# Patient Record
Sex: FEMALE | Race: WHITE | NOT HISPANIC OR LATINO | URBAN - METROPOLITAN AREA
[De-identification: names, ages, dates, MRNs, and addresses within clinical notes are randomized per-mention and may not be internally consistent; named-entity substitution may affect disease eponyms.]

---

## 2017-01-16 ENCOUNTER — EMERGENCY (EMERGENCY)
Facility: HOSPITAL | Age: 82
LOS: 1 days | Discharge: HOME | End: 2017-01-16
Attending: EMERGENCY MEDICINE
Payer: MEDICARE

## 2017-01-16 VITALS
HEIGHT: 58 IN | HEART RATE: 91 BPM | OXYGEN SATURATION: 97 % | RESPIRATION RATE: 17 BRPM | DIASTOLIC BLOOD PRESSURE: 77 MMHG | SYSTOLIC BLOOD PRESSURE: 144 MMHG | WEIGHT: 95.02 LBS | TEMPERATURE: 97 F

## 2017-01-16 VITALS
RESPIRATION RATE: 16 BRPM | SYSTOLIC BLOOD PRESSURE: 121 MMHG | OXYGEN SATURATION: 100 % | TEMPERATURE: 97 F | HEART RATE: 78 BPM | DIASTOLIC BLOOD PRESSURE: 70 MMHG
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
## 2017-01-16 DIAGNOSIS — Z79.82 LONG TERM (CURRENT) USE OF ASPIRIN: ICD-10-CM

## 2017-01-16 DIAGNOSIS — Z88.0 ALLERGY STATUS TO PENICILLIN: ICD-10-CM

## 2017-01-16 DIAGNOSIS — S00.03XA CONTUSION OF SCALP, INITIAL ENCOUNTER: ICD-10-CM

## 2017-01-16 DIAGNOSIS — S09.90XA UNSPECIFIED INJURY OF HEAD, INITIAL ENCOUNTER: ICD-10-CM
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
## 2017-01-16 PROCEDURE — 99284 EMERGENCY DEPT VISIT MOD MDM: CPT

## 2017-01-16 PROCEDURE — 70450 CT HEAD/BRAIN W/O DYE: CPT | Mod: 26

## 2017-01-16 NOTE — ED ADULT TRIAGE NOTE - CHIEF COMPLAINT QUOTE
I hit my head against my dresser last night. I have a bump in my head. Pt states she slipped on moth balls, denies any loc. Pt states she takes a baby asa everyday.

## 2017-01-16 NOTE — ED ADULT NURSE NOTE - OBJECTIVE STATEMENT
" I slipped on a mothball and hit my head on the dresser. I did not pass out.I put iceit and took tylenol this morning"

## 2017-01-16 NOTE — ED ADULT NURSE NOTE - CHPI ED SYMPTOMS NEG
no loss of consciousness/no weakness/no syncope/no blurred vision/no vomiting/no seizure/no nausea/no dizziness/no confusion/no change in level of consciousness

## 2017-01-16 NOTE — ED PROVIDER NOTE - OBJECTIVE STATEMENT
Pertinent PMH/PSH/FHx/SHx and Review of Systems contained within:  89f hx of htn pw headache. last night slipped and fell, hit right side of scalp. no loc. no vision change, vomiting, difficulty walking. iced head overnight. this morning came to get head checked out  Fh and Sh not otherwise contributory  ROS otherwise negative

## 2017-01-16 NOTE — ED PROVIDER NOTE - PHYSICAL EXAMINATION
Gen: elderly female.   Head: right scalp hematoma. no skull defect  Eyes: PERRL, EOMI, normal lids/conjunctiva  ENT: normal hearing, patent oropharynx without erythema/exudate, uvula midline  Neck: supple, no tenderness, Trachea midline  Pulm: Bilateral BS, normal resp effort, no wheeze/stridor/retractions  CV: RRR, no M/R/G, 2+ radial and dp pulses bl, no edema  Abd: soft, NT/ND, +BS, no hepatosplenomegaly  Mskel: extremities x4 with normal ROM and no joint effusions. no ctl spine ttp.   Skin: no rash, no bruising, small hematoma right scalp  Neuro: AAOx3, no sensory/motor deficits, CN 2-12 intact

## 2017-01-25 ENCOUNTER — MEDICATION RENEWAL (OUTPATIENT)
Age: 82
End: 2017-01-25

## 2017-02-03 ENCOUNTER — APPOINTMENT (OUTPATIENT)
Dept: NEPHROLOGY | Facility: CLINIC | Age: 82
End: 2017-02-03

## 2017-02-03 VITALS
HEIGHT: 59 IN | WEIGHT: 97 LBS | BODY MASS INDEX: 19.56 KG/M2 | HEART RATE: 76 BPM | DIASTOLIC BLOOD PRESSURE: 77 MMHG | SYSTOLIC BLOOD PRESSURE: 143 MMHG | OXYGEN SATURATION: 97 %

## 2017-02-03 VITALS — DIASTOLIC BLOOD PRESSURE: 64 MMHG | HEART RATE: 70 BPM | SYSTOLIC BLOOD PRESSURE: 144 MMHG

## 2017-04-03 ENCOUNTER — RX RENEWAL (OUTPATIENT)
Age: 82
End: 2017-04-03

## 2017-04-29 ENCOUNTER — EMERGENCY (EMERGENCY)
Facility: HOSPITAL | Age: 82
LOS: 0 days | Discharge: HOME | End: 2017-04-29
Attending: STUDENT IN AN ORGANIZED HEALTH CARE EDUCATION/TRAINING PROGRAM
Payer: MEDICARE

## 2017-04-29 VITALS
DIASTOLIC BLOOD PRESSURE: 76 MMHG | HEIGHT: 58 IN | TEMPERATURE: 98 F | RESPIRATION RATE: 20 BRPM | WEIGHT: 98.11 LBS | OXYGEN SATURATION: 96 % | HEART RATE: 88 BPM | SYSTOLIC BLOOD PRESSURE: 167 MMHG

## 2017-04-29 VITALS
SYSTOLIC BLOOD PRESSURE: 181 MMHG | RESPIRATION RATE: 17 BRPM | OXYGEN SATURATION: 97 % | TEMPERATURE: 97 F | DIASTOLIC BLOOD PRESSURE: 84 MMHG | HEART RATE: 72 BPM

## 2017-04-29 DIAGNOSIS — R42 DIZZINESS AND GIDDINESS: ICD-10-CM

## 2017-04-29 DIAGNOSIS — Z88.2 ALLERGY STATUS TO SULFONAMIDES: ICD-10-CM

## 2017-04-29 DIAGNOSIS — Z88.0 ALLERGY STATUS TO PENICILLIN: ICD-10-CM

## 2017-04-29 DIAGNOSIS — I10 ESSENTIAL (PRIMARY) HYPERTENSION: ICD-10-CM

## 2017-04-29 LAB
ALBUMIN SERPL ELPH-MCNC: 3.5 G/DL — SIGNIFICANT CHANGE UP (ref 3.3–5)
ALP SERPL-CCNC: 69 U/L — SIGNIFICANT CHANGE UP (ref 40–120)
ALT FLD-CCNC: 25 U/L — SIGNIFICANT CHANGE UP (ref 12–78)
ANION GAP SERPL CALC-SCNC: 9 MMOL/L — SIGNIFICANT CHANGE UP (ref 5–17)
APPEARANCE UR: CLEAR — SIGNIFICANT CHANGE UP
APTT BLD: 33.6 SEC — SIGNIFICANT CHANGE UP (ref 27.5–37.4)
AST SERPL-CCNC: 25 U/L — SIGNIFICANT CHANGE UP (ref 15–37)
BACTERIA # UR AUTO: ABNORMAL
BASOPHILS # BLD AUTO: 0.2 K/UL — SIGNIFICANT CHANGE UP (ref 0–0.2)
BASOPHILS NFR BLD AUTO: 2.3 % — HIGH (ref 0–2)
BILIRUB SERPL-MCNC: 0.3 MG/DL — SIGNIFICANT CHANGE UP (ref 0.2–1.2)
BILIRUB UR-MCNC: NEGATIVE — SIGNIFICANT CHANGE UP
BUN SERPL-MCNC: 28 MG/DL — HIGH (ref 7–23)
CALCIUM SERPL-MCNC: 9.2 MG/DL — SIGNIFICANT CHANGE UP (ref 8.5–10.1)
CHLORIDE SERPL-SCNC: 104 MMOL/L — SIGNIFICANT CHANGE UP (ref 96–108)
CK MB BLD-MCNC: 2.8 % — SIGNIFICANT CHANGE UP (ref 0–3.5)
CK MB CFR SERPL CALC: 4 NG/ML — HIGH (ref 0.5–3.6)
CK SERPL-CCNC: 143 U/L — SIGNIFICANT CHANGE UP (ref 26–192)
CO2 SERPL-SCNC: 29 MMOL/L — SIGNIFICANT CHANGE UP (ref 22–31)
COLOR SPEC: YELLOW — SIGNIFICANT CHANGE UP
CREAT SERPL-MCNC: 0.83 MG/DL — SIGNIFICANT CHANGE UP (ref 0.5–1.3)
DIFF PNL FLD: ABNORMAL
EOSINOPHIL # BLD AUTO: 0.2 K/UL — SIGNIFICANT CHANGE UP (ref 0–0.5)
EOSINOPHIL NFR BLD AUTO: 2.4 % — SIGNIFICANT CHANGE UP (ref 0–6)
EPI CELLS # UR: SIGNIFICANT CHANGE UP
GLUCOSE SERPL-MCNC: 158 MG/DL — HIGH (ref 70–99)
GLUCOSE UR QL: NEGATIVE MG/DL — SIGNIFICANT CHANGE UP
HCT VFR BLD CALC: 40.9 % — SIGNIFICANT CHANGE UP (ref 34.5–45)
HGB BLD-MCNC: 13.5 G/DL — SIGNIFICANT CHANGE UP (ref 11.5–15.5)
INR BLD: 1.01 RATIO — SIGNIFICANT CHANGE UP (ref 0.88–1.16)
KETONES UR-MCNC: NEGATIVE — SIGNIFICANT CHANGE UP
LEUKOCYTE ESTERASE UR-ACNC: ABNORMAL
LYMPHOCYTES # BLD AUTO: 0.8 K/UL — LOW (ref 1–3.3)
LYMPHOCYTES # BLD AUTO: 11.5 % — LOW (ref 13–44)
MCHC RBC-ENTMCNC: 29.6 PG — SIGNIFICANT CHANGE UP (ref 27–34)
MCHC RBC-ENTMCNC: 33.1 GM/DL — SIGNIFICANT CHANGE UP (ref 32–36)
MCV RBC AUTO: 89.5 FL — SIGNIFICANT CHANGE UP (ref 80–100)
MONOCYTES # BLD AUTO: 0.3 K/UL — SIGNIFICANT CHANGE UP (ref 0–0.9)
MONOCYTES NFR BLD AUTO: 4.2 % — SIGNIFICANT CHANGE UP (ref 2–14)
NEUTROPHILS # BLD AUTO: 5.6 K/UL — SIGNIFICANT CHANGE UP (ref 1.8–7.4)
NEUTROPHILS NFR BLD AUTO: 79.5 % — HIGH (ref 43–77)
NITRITE UR-MCNC: NEGATIVE — SIGNIFICANT CHANGE UP
PH UR: 7 — SIGNIFICANT CHANGE UP (ref 5–8)
PLATELET # BLD AUTO: 256 K/UL — SIGNIFICANT CHANGE UP (ref 150–400)
POTASSIUM SERPL-MCNC: 3.9 MMOL/L — SIGNIFICANT CHANGE UP (ref 3.5–5.3)
POTASSIUM SERPL-SCNC: 3.9 MMOL/L — SIGNIFICANT CHANGE UP (ref 3.5–5.3)
PROT SERPL-MCNC: 7 GM/DL — SIGNIFICANT CHANGE UP (ref 6–8.3)
PROT UR-MCNC: NEGATIVE MG/DL — SIGNIFICANT CHANGE UP
PROTHROM AB SERPL-ACNC: 11 SEC — SIGNIFICANT CHANGE UP (ref 9.8–12.7)
RBC # BLD: 4.57 M/UL — SIGNIFICANT CHANGE UP (ref 3.8–5.2)
RBC # FLD: 12.7 % — SIGNIFICANT CHANGE UP (ref 11–15)
RBC CASTS # UR COMP ASSIST: SIGNIFICANT CHANGE UP /HPF (ref 0–4)
SODIUM SERPL-SCNC: 142 MMOL/L — SIGNIFICANT CHANGE UP (ref 135–145)
SP GR SPEC: 1.01 — SIGNIFICANT CHANGE UP (ref 1.01–1.02)
TROPONIN I SERPL-MCNC: <.015 NG/ML — SIGNIFICANT CHANGE UP (ref 0.01–0.04)
UROBILINOGEN FLD QL: NEGATIVE MG/DL — SIGNIFICANT CHANGE UP
WBC # BLD: 7.1 K/UL — SIGNIFICANT CHANGE UP (ref 3.8–10.5)
WBC # FLD AUTO: 7.1 K/UL — SIGNIFICANT CHANGE UP (ref 3.8–10.5)
WBC UR QL: SIGNIFICANT CHANGE UP

## 2017-04-29 PROCEDURE — 99285 EMERGENCY DEPT VISIT HI MDM: CPT

## 2017-04-29 PROCEDURE — 71010: CPT | Mod: 26

## 2017-04-29 PROCEDURE — 70450 CT HEAD/BRAIN W/O DYE: CPT | Mod: 26

## 2017-04-29 RX ORDER — SODIUM CHLORIDE 9 MG/ML
3 INJECTION INTRAMUSCULAR; INTRAVENOUS; SUBCUTANEOUS ONCE
Qty: 0 | Refills: 0 | Status: COMPLETED | OUTPATIENT
Start: 2017-04-29 | End: 2017-04-29

## 2017-04-29 RX ORDER — MECLIZINE HCL 12.5 MG
12.5 TABLET ORAL ONCE
Qty: 0 | Refills: 0 | Status: COMPLETED | OUTPATIENT
Start: 2017-04-29 | End: 2017-04-29

## 2017-04-29 RX ORDER — MECLIZINE HCL 12.5 MG
1 TABLET ORAL
Qty: 30 | Refills: 0 | OUTPATIENT
Start: 2017-04-29 | End: 2017-05-14

## 2017-04-29 RX ORDER — SODIUM CHLORIDE 9 MG/ML
1000 INJECTION INTRAMUSCULAR; INTRAVENOUS; SUBCUTANEOUS
Qty: 0 | Refills: 0 | Status: DISCONTINUED | OUTPATIENT
Start: 2017-04-29 | End: 2017-04-29

## 2017-04-29 RX ADMIN — SODIUM CHLORIDE 3 MILLILITER(S): 9 INJECTION INTRAMUSCULAR; INTRAVENOUS; SUBCUTANEOUS at 09:50

## 2017-04-29 RX ADMIN — Medication 12.5 MILLIGRAM(S): at 12:13

## 2017-04-29 RX ADMIN — SODIUM CHLORIDE 60 MILLILITER(S): 9 INJECTION INTRAMUSCULAR; INTRAVENOUS; SUBCUTANEOUS at 10:39

## 2017-04-29 NOTE — ED PROVIDER NOTE - OBJECTIVE STATEMENT
89 year old female with h/o HTN and vertigo presents today brought in by a close friend c/o dizziness which started since last night, pt describes a dizziness causing her to be unsteady 89 year old female with h/o HTN and vertigo presents today brought in by a close friend c/o dizziness which started since last night, pt describes a dizziness causing her to be unsteady, pt has h/o vertigo many years ago but states that this felt different, (-) nausea or vomiting (-) chest pains (-) sob (-) palpitations (-) headache (-) visual or speech changes

## 2017-04-29 NOTE — ED PROVIDER NOTE - MEDICAL DECISION MAKING DETAILS
pt presented c/o dizziness since last night with unsteadiness, labs, ekg, xray chest and ct head pt presented c/o dizziness since last night with unsteadiness, labs, ekg, xray chest and ct head, pt seen by dr nieves who cleared her for discharge, outpt followup, antivert prescribed

## 2017-04-29 NOTE — ED PROVIDER NOTE - PROGRESS NOTE DETAILS
pt seen and evaluated by dr nieves in the ER, pt cleared for discharge, vitals stable, able to eat, no vomiting noted, outpt follow up +urine culture, not on abx, mailgram sent

## 2017-04-29 NOTE — CONSULT NOTE ADULT - SUBJECTIVE AND OBJECTIVE BOX
Historian:     Patient.  ER notes also reviewed.  HPI:    RENETTA LARSEN.  · HPI Objective Statement: 89 year old female with h/o HTN and vertigo presents today brought in by a close friend c/o dizziness which started since last night, pt describes a dizziness causing her to be unsteady, pt has h/o vertigo many years ago but states that this felt different, (-) nausea or vomiting (-) chest pains (-) sob (-) palpitations (-) headache (-) visual or speech changes    Brain CT:  Involutional and ischemic gliotic changes.  No acute intracranial hemorrhage.    PAST MEDICAL & SURGICAL HISTORY:  Hypertension; No significant past surgical history  MEDICATIONS  (STANDING):  sodium chloride 0.9%. 1000milliLiter(s) IV Continuous <Continuous>  MEDICATIONS  (PRN):  Allergies: penicillin (Unknown)  sulfa drugs (Unknown)    Intolerances      FAMILY HISTORY:  No pertinent family history in first degree relatives    Vital Signs Last 24 Hrs  T(C): 37.1, Max: 37.1 ( @ 10:56)  T(F): 98.7, Max: 98.7 ( @ 10:56)  HR: 67 (67 - 88)  BP: 157/68 (157/68 - 167/76)  BP(mean): --  RR: 21 (20 - 21)  SpO2: 98% (96% - 98%)    NEUROLOGICAL EXAM:  HENT:  Normocephalic head; atraumatic head.  Neck supple.  ENT: normal looking.  Mental State:   Excellent for her age.   Alert.  Fully oriented to person, place and date.  Coherent.  Speech clear and intact.  Cooperative.  Responds appropriately.    Cranial Nerves:  II-XII:   Pupils round and reactive to light and accommodation.  Extraocular movements full.  Visual fields full (no homonymous hemianopsia).  Visual acuity wnl.  Facial symmetry intact.  Tongue midline.  Motor Functions:  Moves all extremities.  No pronator drift of UE.  Claps hands well.  Hand  intact bilaterally.   Sensory Functions:   Intact to touch and pinprick to face and extremities.    Reflexes:  Deep tendon reflexes normoactive to knees and ankles.  Babinski absent   Cerebellar Testing:    Finger to nose intact.  Nystagmus absent.   Neurovascular: Carotid auscultation full without bruits.      LABS:                        13.5   7.1   )-----------( 256      ( 2017 09:54 )             40.9         142  |  104  |  28<H>  ----------------------------<  158<H>  3.9   |  29  |  0.83    Ca    9.2      2017 09:54    TPro  7.0  /  Alb  3.5  /  TBili  0.3  /  DBili  x   /  AST  25  /  ALT  25  /  AlkPhos  69      PT/INR - ( 2017 09:54 )   PT: 11.0 sec;   INR: 1.01 ratio         PTT - ( 2017 09:54 )  PTT:33.6 sec    Urinalysis Basic - ( 2017 11:25 )    Color: Yellow / Appearance: Clear / S.010 / pH: x  Gluc: x / Ketone: Negative  / Bili: Negative / Urobili: Negative mg/dL   Blood: x / Protein: Negative mg/dL / Nitrite: Negative   Leuk Esterase: Trace / RBC: 0-2 /HPF / WBC 3-5   Sq Epi: x / Non Sq Epi: Occasional / Bacteria: Moderate    RADIOLOGY & ADDITIONAL STUDIES:    Comprehensive Metabolic Panel: STAT ( @ 09:12)  Complete Blood Count + Automated Diff: STAT ( @ 09:12)  Creatine Kinase, Serum: STAT ( @ 09:12)  CKMB Mass Assay: STAT ( @ 09:12)  Troponin I, Serum: STAT ( @ 09:12)  Activated Partial Thromboplastin Time:  Start Date:2017. STAT ( @ 09:12)  Prothrombin Time and INR, Plasma:  Start Date:2017. STAT ( @ 09:12)  Saline Lock Insert.:     Time/Priority:  STAT ( @ 09:12)  sodium chloride 0.9% lock flush:   3 milliLiter(s), IV Push, once, Stop After 1 Doses ( @ 09:12) [completed]  Vital Signs:     Frequency:  per routine ( @ 09:12)  Pulse Oximetry:   Frequency: &lt;Continuous&gt;    Additional Instructions:  Notify Provider if oxygen saturation is LESS THAN 92% ( @ 09:12)  Cardiac Monitor Bedside:     Time/Priority:  STAT ( @ 09:12)  Assess Pain:     Frequency:  per routine ( @ 09:12)  Activity - Bedrest ( @ 09:12)  sodium chloride 0.9%.: Solution, 1000 milliLiter(s) infuse at 60 mL/Hr  Provider&#x27;s Contact #: [phone] ( @ 10:18)  CT Head No Cont: Urgent   Indication: dizziness and unsteadiness  Transport: Stretcher-Crib  Exam Completed  Provider&#x27;s Contact #: [phone] ( @ 10:18)  Xray Chest 1 View AP/PA.: Urgent   Indication: r/o infiltrate  Transport: Portable  Exam Completed  Provider&#x27;s Contact #: [phone] ( @ 10:18)  Urinalysis: STAT ( @ 10:18)  Culture - Urine: Routine  Specimen Source: Clean Catch (Midstream) ( @ 10:18)  Urine Microscopic-Add On (NC): 11:20 ( @ 11:34)  meclizine: [Ordered as ANTIVERT]  12.5 milliGRAM(s), Oral, Once, Stop After 1 Doses  Provider&#x27;s Contact #: [phone] ( @ 12:04) [completed]  12 Lead ECG ( @ 13:24)    Assessment & Opinion:  Dizziness/Vertigo.  Normal Neurologic Exam for this elderly.    Recommendations:  Continue Antivert 25 mg. BID.  Neurologically cleared for dc.  Office follow up.

## 2017-04-29 NOTE — ED ADULT TRIAGE NOTE - CHIEF COMPLAINT QUOTE
patient c/o of dizziness started last night , patient denied chest pain no difficulty breathing , denied headache no N/V at this time

## 2017-05-03 LAB
CULTURE RESULTS: SIGNIFICANT CHANGE UP
ORGANISM # SPEC MICROSCOPIC CNT: SIGNIFICANT CHANGE UP
ORGANISM # SPEC MICROSCOPIC CNT: SIGNIFICANT CHANGE UP
SPECIMEN SOURCE: SIGNIFICANT CHANGE UP

## 2017-06-02 ENCOUNTER — MEDICATION RENEWAL (OUTPATIENT)
Age: 82
End: 2017-06-02

## 2017-06-27 ENCOUNTER — APPOINTMENT (OUTPATIENT)
Dept: NEPHROLOGY | Facility: CLINIC | Age: 82
End: 2017-06-27

## 2017-06-27 VITALS
BODY MASS INDEX: 20.22 KG/M2 | DIASTOLIC BLOOD PRESSURE: 85 MMHG | HEIGHT: 59 IN | HEART RATE: 80 BPM | SYSTOLIC BLOOD PRESSURE: 190 MMHG | OXYGEN SATURATION: 97 % | WEIGHT: 100.31 LBS

## 2017-06-27 VITALS — SYSTOLIC BLOOD PRESSURE: 180 MMHG | HEART RATE: 78 BPM | DIASTOLIC BLOOD PRESSURE: 80 MMHG

## 2017-06-27 LAB
APPEARANCE: CLEAR
BACTERIA: ABNORMAL
BILIRUBIN URINE: NEGATIVE
BLOOD URINE: NEGATIVE
COLOR: YELLOW
CREAT SPEC-SCNC: 30 MG/DL
GLUCOSE QUALITATIVE U: NORMAL MG/DL
HYALINE CASTS: 0 /LPF
KETONES URINE: NEGATIVE
LEUKOCYTE ESTERASE URINE: NEGATIVE
MICROALBUMIN 24H UR DL<=1MG/L-MCNC: 1.5 MG/DL
MICROALBUMIN/CREAT 24H UR-RTO: 50 MG/G
MICROSCOPIC-UA: NORMAL
NITRITE URINE: NEGATIVE
PH URINE: 7
PROTEIN URINE: NEGATIVE MG/DL
RED BLOOD CELLS URINE: 1 /HPF
SPECIFIC GRAVITY URINE: 1.01
SQUAMOUS EPITHELIAL CELLS: 2 /HPF
UROBILINOGEN URINE: NORMAL MG/DL
WHITE BLOOD CELLS URINE: 2 /HPF

## 2017-06-29 ENCOUNTER — OTHER (OUTPATIENT)
Age: 82
End: 2017-06-29

## 2017-06-29 LAB — BACTERIA UR CULT: ABNORMAL

## 2017-10-23 ENCOUNTER — APPOINTMENT (OUTPATIENT)
Dept: NEPHROLOGY | Facility: CLINIC | Age: 82
End: 2017-10-23
Payer: MEDICARE

## 2017-10-23 VITALS
HEIGHT: 59 IN | BODY MASS INDEX: 19.56 KG/M2 | DIASTOLIC BLOOD PRESSURE: 82 MMHG | WEIGHT: 97 LBS | HEART RATE: 82 BPM | OXYGEN SATURATION: 98 % | SYSTOLIC BLOOD PRESSURE: 173 MMHG

## 2017-10-23 VITALS — DIASTOLIC BLOOD PRESSURE: 60 MMHG | SYSTOLIC BLOOD PRESSURE: 146 MMHG | HEART RATE: 78 BPM

## 2017-10-23 PROCEDURE — 99213 OFFICE O/P EST LOW 20 MIN: CPT

## 2018-03-05 ENCOUNTER — MEDICATION RENEWAL (OUTPATIENT)
Age: 83
End: 2018-03-05

## 2018-03-05 ENCOUNTER — RX RENEWAL (OUTPATIENT)
Age: 83
End: 2018-03-05

## 2018-03-30 ENCOUNTER — EMERGENCY (EMERGENCY)
Facility: HOSPITAL | Age: 83
LOS: 0 days | Discharge: HOME | End: 2018-03-30
Attending: EMERGENCY MEDICINE
Payer: MEDICARE

## 2018-03-30 VITALS
SYSTOLIC BLOOD PRESSURE: 148 MMHG | RESPIRATION RATE: 15 BRPM | HEART RATE: 75 BPM | DIASTOLIC BLOOD PRESSURE: 74 MMHG | OXYGEN SATURATION: 96 % | TEMPERATURE: 98 F

## 2018-03-30 VITALS
TEMPERATURE: 98 F | SYSTOLIC BLOOD PRESSURE: 197 MMHG | OXYGEN SATURATION: 99 % | RESPIRATION RATE: 14 BRPM | WEIGHT: 93.92 LBS | DIASTOLIC BLOOD PRESSURE: 113 MMHG | HEIGHT: 58 IN | HEART RATE: 88 BPM
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
## 2018-03-30 DIAGNOSIS — S09.90XA UNSPECIFIED INJURY OF HEAD, INITIAL ENCOUNTER: ICD-10-CM

## 2018-03-30 DIAGNOSIS — Z79.82 LONG TERM (CURRENT) USE OF ASPIRIN: ICD-10-CM

## 2018-03-30 DIAGNOSIS — Z88.0 ALLERGY STATUS TO PENICILLIN: ICD-10-CM

## 2018-03-30 DIAGNOSIS — S00.03XA CONTUSION OF SCALP, INITIAL ENCOUNTER: ICD-10-CM

## 2018-03-30 PROCEDURE — 73562 X-RAY EXAM OF KNEE 3: CPT | Mod: 26,LT

## 2018-03-30 PROCEDURE — 72125 CT NECK SPINE W/O DYE: CPT | Mod: 26

## 2018-03-30 PROCEDURE — 70450 CT HEAD/BRAIN W/O DYE: CPT | Mod: 26

## 2018-03-30 PROCEDURE — 99284 EMERGENCY DEPT VISIT MOD MDM: CPT

## 2018-03-30 PROCEDURE — 71101 X-RAY EXAM UNILAT RIBS/CHEST: CPT | Mod: 26,LT

## 2018-03-30 RX ORDER — ACETAMINOPHEN 500 MG
975 TABLET ORAL ONCE
Qty: 0 | Refills: 0 | Status: COMPLETED | OUTPATIENT
Start: 2018-03-30 | End: 2018-03-30

## 2018-03-30 RX ADMIN — Medication 975 MILLIGRAM(S): at 12:12

## 2018-03-30 NOTE — ED ADULT NURSE NOTE - CAS EDN DISCHARGE ASSESSMENT
Awake/No adverse reaction to first time med in ED/Alert and oriented to person, place and time/Symptoms improved/Dressing clean and dry

## 2018-03-30 NOTE — ED PROVIDER NOTE - PHYSICAL EXAMINATION
Gen: Alert, NAD  Head: very small scalp hematoma  Eyes: PERRL, EOMI, normal lids/conjunctiva  ENT: normal hearing, patent oropharynx without erythema/exudate, uvula midline  Neck: supple, no tenderness, Trachea midline  Pulm: Bilateral BS, normal resp effort, no wheeze/stridor/retractions  CV: RRR, no M/R/G, 2+ radial and dp pulses bl, no edema  Abd: soft, NT/ND, +BS, no hepatosplenomegaly  Mskel: left knee small effusion, normal rom. left elbow normal rom. left shoulder normal rom. no ctl spine ttp.   Skin: ecchymosis of the left knee and left elbow.   Neuro: AAOx3, no sensory/motor deficits, CN 2-12 intact

## 2018-03-30 NOTE — ED ADULT NURSE NOTE - OBJECTIVE STATEMENT
Pt states she slid off bed while attempting to answer her phone. Pt states she fell on her left side denies any loc with fall. Pt c/o left side rib pain worsens with deep inspiration, denies any chest pain

## 2018-03-30 NOTE — ED ADULT TRIAGE NOTE - CHIEF COMPLAINT QUOTE
Fell  off bed , attempting picking up ringing phone .stated she hit her head ,On Baby aspirin daily .  denies loc ,  C/o entire left side pain, pain left rib , worse with deep breathing , bruise to left elbow

## 2018-04-05 ENCOUNTER — RX RENEWAL (OUTPATIENT)
Age: 83
End: 2018-04-05

## 2018-04-30 ENCOUNTER — APPOINTMENT (OUTPATIENT)
Dept: NEPHROLOGY | Facility: CLINIC | Age: 83
End: 2018-04-30
Payer: MEDICARE

## 2018-04-30 VITALS
OXYGEN SATURATION: 98 % | BODY MASS INDEX: 19.11 KG/M2 | WEIGHT: 94.8 LBS | HEIGHT: 59 IN | HEART RATE: 71 BPM | DIASTOLIC BLOOD PRESSURE: 55 MMHG | SYSTOLIC BLOOD PRESSURE: 112 MMHG | TEMPERATURE: 97.8 F

## 2018-04-30 PROCEDURE — 99213 OFFICE O/P EST LOW 20 MIN: CPT

## 2018-07-24 ENCOUNTER — MEDICATION RENEWAL (OUTPATIENT)
Age: 83
End: 2018-07-24

## 2018-07-24 ENCOUNTER — RX RENEWAL (OUTPATIENT)
Age: 83
End: 2018-07-24

## 2018-10-04 ENCOUNTER — APPOINTMENT (OUTPATIENT)
Dept: NEPHROLOGY | Facility: CLINIC | Age: 83
End: 2018-10-04
Payer: MEDICARE

## 2018-10-04 VITALS
WEIGHT: 94.8 LBS | HEART RATE: 70 BPM | BODY MASS INDEX: 19.15 KG/M2 | DIASTOLIC BLOOD PRESSURE: 70 MMHG | SYSTOLIC BLOOD PRESSURE: 168 MMHG

## 2018-10-04 PROCEDURE — 99214 OFFICE O/P EST MOD 30 MIN: CPT

## 2018-10-07 ENCOUNTER — RESULT CHARGE (OUTPATIENT)
Age: 83
End: 2018-10-07

## 2018-10-07 ENCOUNTER — EMERGENCY (EMERGENCY)
Facility: HOSPITAL | Age: 83
LOS: 0 days | Discharge: HOME | End: 2018-10-08
Attending: EMERGENCY MEDICINE
Payer: MEDICARE

## 2018-10-07 ENCOUNTER — OTHER (OUTPATIENT)
Age: 83
End: 2018-10-07

## 2018-10-07 VITALS
DIASTOLIC BLOOD PRESSURE: 77 MMHG | HEART RATE: 79 BPM | HEIGHT: 59 IN | RESPIRATION RATE: 17 BRPM | SYSTOLIC BLOOD PRESSURE: 163 MMHG | TEMPERATURE: 98 F | OXYGEN SATURATION: 100 % | WEIGHT: 95.02 LBS

## 2018-10-07 VITALS
RESPIRATION RATE: 17 BRPM | HEART RATE: 89 BPM | OXYGEN SATURATION: 92 % | SYSTOLIC BLOOD PRESSURE: 162 MMHG | DIASTOLIC BLOOD PRESSURE: 83 MMHG | TEMPERATURE: 98 F

## 2018-10-07 DIAGNOSIS — Z79.2 LONG TERM (CURRENT) USE OF ANTIBIOTICS: ICD-10-CM

## 2018-10-07 DIAGNOSIS — M54.2 CERVICALGIA: ICD-10-CM

## 2018-10-07 DIAGNOSIS — Z88.0 ALLERGY STATUS TO PENICILLIN: ICD-10-CM

## 2018-10-07 DIAGNOSIS — Z79.899 OTHER LONG TERM (CURRENT) DRUG THERAPY: ICD-10-CM

## 2018-10-07 DIAGNOSIS — R51 HEADACHE: ICD-10-CM

## 2018-10-07 DIAGNOSIS — I10 ESSENTIAL (PRIMARY) HYPERTENSION: ICD-10-CM

## 2018-10-07 DIAGNOSIS — Z79.82 LONG TERM (CURRENT) USE OF ASPIRIN: ICD-10-CM

## 2018-10-07 LAB
ANION GAP SERPL CALC-SCNC: 7 MMOL/L — SIGNIFICANT CHANGE UP (ref 5–17)
APPEARANCE: CLEAR
BACTERIA UR CULT: ABNORMAL
BACTERIA: ABNORMAL
BASOPHILS # BLD AUTO: 0.11 K/UL — SIGNIFICANT CHANGE UP (ref 0–0.2)
BASOPHILS NFR BLD AUTO: 1.4 % — SIGNIFICANT CHANGE UP (ref 0–2)
BILIRUBIN URINE: NEGATIVE
BLOOD URINE: NEGATIVE
BUN SERPL-MCNC: 25 MG/DL — HIGH (ref 7–23)
CALCIUM SERPL-MCNC: 9.4 MG/DL — SIGNIFICANT CHANGE UP (ref 8.5–10.1)
CHLORIDE SERPL-SCNC: 104 MMOL/L — SIGNIFICANT CHANGE UP (ref 96–108)
CO2 SERPL-SCNC: 29 MMOL/L — SIGNIFICANT CHANGE UP (ref 22–31)
COLOR: YELLOW
CREAT SERPL-MCNC: 0.65 MG/DL — SIGNIFICANT CHANGE UP (ref 0.5–1.3)
EOSINOPHIL # BLD AUTO: 0.34 K/UL — SIGNIFICANT CHANGE UP (ref 0–0.5)
EOSINOPHIL NFR BLD AUTO: 4.3 % — SIGNIFICANT CHANGE UP (ref 0–6)
GLUCOSE QUALITATIVE U: NEGATIVE MG/DL
GLUCOSE SERPL-MCNC: 112 MG/DL — HIGH (ref 70–99)
HCT VFR BLD CALC: 43.4 % — SIGNIFICANT CHANGE UP (ref 34.5–45)
HGB BLD-MCNC: 13.7 G/DL — SIGNIFICANT CHANGE UP (ref 11.5–15.5)
HYALINE CASTS: 0 /LPF
IMM GRANULOCYTES NFR BLD AUTO: 0.3 % — SIGNIFICANT CHANGE UP (ref 0–1.5)
KETONES URINE: NEGATIVE
LEUKOCYTE ESTERASE URINE: NEGATIVE
LYMPHOCYTES # BLD AUTO: 0.9 K/UL — LOW (ref 1–3.3)
LYMPHOCYTES # BLD AUTO: 11.4 % — LOW (ref 13–44)
MCHC RBC-ENTMCNC: 28.6 PG — SIGNIFICANT CHANGE UP (ref 27–34)
MCHC RBC-ENTMCNC: 31.6 GM/DL — LOW (ref 32–36)
MCV RBC AUTO: 90.6 FL — SIGNIFICANT CHANGE UP (ref 80–100)
MICROSCOPIC-UA: NORMAL
MONOCYTES # BLD AUTO: 1.02 K/UL — HIGH (ref 0–0.9)
MONOCYTES NFR BLD AUTO: 12.9 % — SIGNIFICANT CHANGE UP (ref 2–14)
NEUTROPHILS # BLD AUTO: 5.53 K/UL — SIGNIFICANT CHANGE UP (ref 1.8–7.4)
NEUTROPHILS NFR BLD AUTO: 69.7 % — SIGNIFICANT CHANGE UP (ref 43–77)
NITRITE URINE: POSITIVE
NRBC # BLD: 0 /100 WBCS — SIGNIFICANT CHANGE UP (ref 0–0)
PH URINE: 7
PLATELET # BLD AUTO: 268 K/UL — SIGNIFICANT CHANGE UP (ref 150–400)
POTASSIUM SERPL-MCNC: 4.3 MMOL/L — SIGNIFICANT CHANGE UP (ref 3.5–5.3)
POTASSIUM SERPL-SCNC: 4.3 MMOL/L — SIGNIFICANT CHANGE UP (ref 3.5–5.3)
PROTEIN URINE: NEGATIVE MG/DL
RBC # BLD: 4.79 M/UL — SIGNIFICANT CHANGE UP (ref 3.8–5.2)
RBC # FLD: 13.9 % — SIGNIFICANT CHANGE UP (ref 10.3–14.5)
RED BLOOD CELLS URINE: 0 /HPF
SODIUM SERPL-SCNC: 140 MMOL/L — SIGNIFICANT CHANGE UP (ref 135–145)
SPECIFIC GRAVITY URINE: 1.01
SQUAMOUS EPITHELIAL CELLS: 1 /HPF
UROBILINOGEN URINE: NEGATIVE MG/DL
WBC # BLD: 7.92 K/UL — SIGNIFICANT CHANGE UP (ref 3.8–10.5)
WBC # FLD AUTO: 7.92 K/UL — SIGNIFICANT CHANGE UP (ref 3.8–10.5)
WHITE BLOOD CELLS URINE: 4 /HPF

## 2018-10-07 PROCEDURE — 70450 CT HEAD/BRAIN W/O DYE: CPT | Mod: 26,59

## 2018-10-07 PROCEDURE — 99284 EMERGENCY DEPT VISIT MOD MDM: CPT

## 2018-10-07 PROCEDURE — 70496 CT ANGIOGRAPHY HEAD: CPT | Mod: 26

## 2018-10-07 PROCEDURE — 70498 CT ANGIOGRAPHY NECK: CPT | Mod: 26

## 2018-10-07 RX ORDER — SODIUM CHLORIDE 9 MG/ML
500 INJECTION INTRAMUSCULAR; INTRAVENOUS; SUBCUTANEOUS ONCE
Qty: 0 | Refills: 0 | Status: COMPLETED | OUTPATIENT
Start: 2018-10-07 | End: 2018-10-07

## 2018-10-07 RX ADMIN — SODIUM CHLORIDE 500 MILLILITER(S): 9 INJECTION INTRAMUSCULAR; INTRAVENOUS; SUBCUTANEOUS at 21:48

## 2018-10-07 RX ADMIN — SODIUM CHLORIDE 500 MILLILITER(S): 9 INJECTION INTRAMUSCULAR; INTRAVENOUS; SUBCUTANEOUS at 22:48

## 2018-10-07 NOTE — ED PROVIDER NOTE - MEDICAL DECISION MAKING DETAILS
Patient presents for concern about right sided head and neck pain in setting of HTN.  VSS, neuro intact.  Pain free in ER.  Labs and CT imaging reviewed, no emergent findings.  Patient feels good, reassured, wants to go home.  Will be following up with her PMD regarding BP meds.  BP currently well controlled.  Discussed results and outcome of today's visit with the patient.  Patient advised to please follow up with another healthcare provider within the next 24 hours and return to the Emergency Department for worsening symptoms or any other concerns.  Patient advised that their doctor may call  to follow up on the specific results of the tests performed today in the emergency department.   Patient appears well on discharge.

## 2018-10-07 NOTE — ED ADULT NURSE NOTE - NSSISCREENINGQ2_ED_A_ED
CARDIOVASCULAR - ADULT    • Maintains optimal cardiac output and hemodynamic stability Progressing    • Absence of cardiac arrhythmias or at baseline Progressing          NEUROLOGICAL - ADULT    • Achieves stable or improved neurological status Progressing No

## 2018-10-07 NOTE — ED ADULT TRIAGE NOTE - CHIEF COMPLAINT QUOTE
"I came because I took my blood pressure it was 203/93 and my head, the right side started to hurt" patient saw a doctor last week and her meds were changed but  nothing changed
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
## 2018-10-07 NOTE — ED PROVIDER NOTE - PHYSICAL EXAMINATION
Gen: Alert, NAD, well appearing  Head: NC, AT, PERRL, EOMI, normal lids/conjunctiva, no tenderness to temples  ENT: normal hearing, patent oropharynx without erythema/exudate, uvula midline  Neck: +supple, no tenderness/meningismus/JVD, +Trachea midline  Pulm: Bilateral BS, normal resp effort, no wheeze/stridor/retractions  CV: RRR, +systolic murmur, no R/G, +dist pulses  Abd: soft, NT/ND, +BS, no palpable masses  Mskel: no edema/erythema/cyanosis  Skin: no rash, warm/dry  Neuro: AAOx3, no apparent sensory/motor deficits, coordination intact

## 2018-10-07 NOTE — ED ADULT NURSE NOTE - NSIMPLEMENTINTERV_GEN_ALL_ED
Implemented All Fall with Harm Risk Interventions:  [place] to call system. Call bell, personal items and telephone within reach. Instruct patient to call for assistance. Room bathroom lighting operational. Non-slip footwear when patient is off stretcher. Physically safe environment: no spills, clutter or unnecessary equipment. Stretcher in lowest position, wheels locked, appropriate side rails in place. Provide visual cue, wrist band, yellow gown, etc. Monitor gait and stability. Monitor for mental status changes and reorient to person, place, and time. Review medications for side effects contributing to fall risk. Reinforce activity limits and safety measures with patient and family. Provide visual clues: red socks.

## 2018-10-07 NOTE — ED PROVIDER NOTE - CARE PLAN
Principal Discharge DX:	Headache  Secondary Diagnosis:	Neck pain on right side  Secondary Diagnosis:	Hypertension, unspecified type

## 2018-11-29 ENCOUNTER — RECORD ABSTRACTING (OUTPATIENT)
Age: 83
End: 2018-11-29

## 2018-11-29 DIAGNOSIS — R59.0 LOCALIZED ENLARGED LYMPH NODES: ICD-10-CM

## 2018-11-29 DIAGNOSIS — Z82.3 FAMILY HISTORY OF STROKE: ICD-10-CM

## 2018-11-29 DIAGNOSIS — S22.20XA UNSPECIFIED FRACTURE OF STERNUM, INITIAL ENCOUNTER FOR CLOSED FRACTURE: ICD-10-CM

## 2018-11-29 DIAGNOSIS — Z86.79 PERSONAL HISTORY OF OTHER DISEASES OF THE CIRCULATORY SYSTEM: ICD-10-CM

## 2018-11-29 DIAGNOSIS — K21.0 GASTRO-ESOPHAGEAL REFLUX DISEASE WITH ESOPHAGITIS: ICD-10-CM

## 2018-11-29 DIAGNOSIS — Z86.2 PERSONAL HISTORY OF DISEASES OF THE BLOOD AND BLOOD-FORMING ORGANS AND CERTAIN DISORDERS INVOLVING THE IMMUNE MECHANISM: ICD-10-CM

## 2018-11-29 DIAGNOSIS — Z87.898 PERSONAL HISTORY OF OTHER SPECIFIED CONDITIONS: ICD-10-CM

## 2018-11-29 DIAGNOSIS — R22.1 LOCALIZED SWELLING, MASS AND LUMP, NECK: ICD-10-CM

## 2018-11-29 DIAGNOSIS — Z80.0 FAMILY HISTORY OF MALIGNANT NEOPLASM OF DIGESTIVE ORGANS: ICD-10-CM

## 2018-11-29 DIAGNOSIS — R10.9 UNSPECIFIED ABDOMINAL PAIN: ICD-10-CM

## 2018-11-29 DIAGNOSIS — Z87.39 PERSONAL HISTORY OF OTHER DISEASES OF THE MUSCULOSKELETAL SYSTEM AND CONNECTIVE TISSUE: ICD-10-CM

## 2018-11-29 DIAGNOSIS — Z86.19 PERSONAL HISTORY OF OTHER INFECTIOUS AND PARASITIC DISEASES: ICD-10-CM

## 2018-11-29 DIAGNOSIS — M25.511 PAIN IN RIGHT SHOULDER: ICD-10-CM

## 2018-11-29 DIAGNOSIS — Z87.2 PERSONAL HISTORY OF DISEASES OF THE SKIN AND SUBCUTANEOUS TISSUE: ICD-10-CM

## 2018-11-29 RX ORDER — HALOBETASOL PROPIONATE 0.5 MG/G
0.05 CREAM TOPICAL
Refills: 0 | Status: ACTIVE | COMMUNITY

## 2018-11-29 RX ORDER — CALCIUM CARB/VIT D3/MINERALS 600 MG-400
600-400 TABLET ORAL TWICE DAILY
Refills: 0 | Status: ACTIVE | COMMUNITY

## 2018-11-30 ENCOUNTER — MEDICATION RENEWAL (OUTPATIENT)
Age: 83
End: 2018-11-30

## 2018-12-11 ENCOUNTER — NON-APPOINTMENT (OUTPATIENT)
Age: 83
End: 2018-12-11

## 2018-12-11 ENCOUNTER — APPOINTMENT (OUTPATIENT)
Dept: INTERNAL MEDICINE | Facility: CLINIC | Age: 83
End: 2018-12-11
Payer: MEDICARE

## 2018-12-11 VITALS
WEIGHT: 93 LBS | DIASTOLIC BLOOD PRESSURE: 90 MMHG | HEIGHT: 56 IN | HEART RATE: 80 BPM | SYSTOLIC BLOOD PRESSURE: 130 MMHG | BODY MASS INDEX: 20.92 KG/M2

## 2018-12-11 DIAGNOSIS — R09.82 POSTNASAL DRIP: ICD-10-CM

## 2018-12-11 DIAGNOSIS — M25.471 EFFUSION, RIGHT ANKLE: ICD-10-CM

## 2018-12-11 DIAGNOSIS — K21.9 GASTRO-ESOPHAGEAL REFLUX DISEASE W/OUT ESOPHAGITIS: ICD-10-CM

## 2018-12-11 DIAGNOSIS — Z63.4 DISAPPEARANCE AND DEATH OF FAMILY MEMBER: ICD-10-CM

## 2018-12-11 DIAGNOSIS — Z87.440 PERSONAL HISTORY OF URINARY (TRACT) INFECTIONS: ICD-10-CM

## 2018-12-11 DIAGNOSIS — S46.812A STRAIN OF OTHER MUSCLES, FASCIA AND TENDONS AT SHOULDER AND UPPER ARM LEVEL, LEFT ARM, INITIAL ENCOUNTER: ICD-10-CM

## 2018-12-11 LAB
BILIRUB UR QL STRIP: NEGATIVE
CLARITY UR: CLEAR
COLLECTION METHOD: NORMAL
GLUCOSE UR-MCNC: NEGATIVE
HCG UR QL: 0.2 EU/DL
HGB UR QL STRIP.AUTO: NEGATIVE
KETONES UR-MCNC: NEGATIVE
LEUKOCYTE ESTERASE UR QL STRIP: NORMAL
NITRITE UR QL STRIP: NEGATIVE
PH UR STRIP: 7.5
PROT UR STRIP-MCNC: NEGATIVE
SP GR UR STRIP: 1.01

## 2018-12-11 PROCEDURE — 36415 COLL VENOUS BLD VENIPUNCTURE: CPT

## 2018-12-11 PROCEDURE — G0439: CPT

## 2018-12-11 PROCEDURE — 93000 ELECTROCARDIOGRAM COMPLETE: CPT

## 2018-12-11 PROCEDURE — 81003 URINALYSIS AUTO W/O SCOPE: CPT | Mod: QW

## 2018-12-11 RX ORDER — CEPHALEXIN 250 MG/1
250 CAPSULE ORAL TWICE DAILY
Qty: 10 | Refills: 1 | Status: DISCONTINUED | COMMUNITY
Start: 2018-10-07 | End: 2018-12-11

## 2018-12-11 SDOH — SOCIAL STABILITY - SOCIAL INSECURITY: DISSAPEARANCE AND DEATH OF FAMILY MEMBER: Z63.4

## 2018-12-11 NOTE — HEALTH RISK ASSESSMENT
[Good] : ~his/her~  mood as  good [Two or more falls in past year] : Patient reported two or more falls in the past year [Patient declined mammogram] : Patient declined mammogram [Patient declined PAP Smear] : Patient declined PAP Smear [Handling Complex Tasks] : difficulty handling complex tasks [None] : None [Alone] : lives alone [Retired] : retired [] :  [Fully functional (bathing, dressing, toileting, transferring, walking, feeding)] : Fully functional (bathing, dressing, toileting, transferring, walking, feeding) [Fully functional (using the telephone, shopping, preparing meals, housekeeping, doing laundry, using] : Fully functional and needs no help or supervision to perform IADLs (using the telephone, shopping, preparing meals, housekeeping, doing laundry, using transportation, managing medications and managing finances) [Reports changes in hearing] : Reports changes in hearing [Discussed at today's visit] : Advance Directives Discussed at today's visit [] : No [[id number]] : 0 [Change in mental status noted] : No change in mental status noted [Language] : denies difficulty with language [Behavior] : denies difficulty with behavior [Learning/Retaining New Information] : denies difficulty learning/retaining new information [Reasoning] : denies difficulty with reasoning [Spatial Ability and Orientation] : denies difficulty with spatial ability and orientation [Reports changes in vision] : Reports no changes in vision [Reports changes in dental health] : Reports no changes in dental health

## 2018-12-11 NOTE — PHYSICAL EXAM
[No Acute Distress] : no acute distress [Well Nourished] : well nourished [Well Developed] : well developed [Well-Appearing] : well-appearing [Normal Sclera/Conjunctiva] : normal sclera/conjunctiva [PERRL] : pupils equal round and reactive to light [EOMI] : extraocular movements intact [Normal Outer Ear/Nose] : the outer ears and nose were normal in appearance [Normal Oropharynx] : the oropharynx was normal [Normal TMs] : both tympanic membranes were normal [No JVD] : no jugular venous distention [Supple] : supple [No Lymphadenopathy] : no lymphadenopathy [Thyroid Normal, No Nodules] : the thyroid was normal and there were no nodules present [No Respiratory Distress] : no respiratory distress  [Clear to Auscultation] : lungs were clear to auscultation bilaterally [No Accessory Muscle Use] : no accessory muscle use [Normal Percussion] : the chest was normal to percussion [Normal Rate] : normal rate  [Regular Rhythm] : with a regular rhythm [Normal S1, S2] : normal S1 and S2 [No Murmur] : no murmur heard [No Carotid Bruits] : no carotid bruits [No Abdominal Bruit] : a ~M bruit was not heard ~T in the abdomen [No Varicosities] : no varicosities [Pedal Pulses Present] : the pedal pulses are present [No Edema] : there was no peripheral edema [No Extremity Clubbing/Cyanosis] : no extremity clubbing/cyanosis [No Palpable Aorta] : no palpable aorta [Normal Appearance] : normal in appearance [No Axillary Lymphadenopathy] : no axillary lymphadenopathy [Soft] : abdomen soft [Non Tender] : non-tender [Non-distended] : non-distended [No Masses] : no abdominal mass palpated [No HSM] : no HSM [Normal Bowel Sounds] : normal bowel sounds [No Hernias] : no hernias [Normal Supraclavicular Nodes] : no supraclavicular lymphadenopathy [Normal Axillary Nodes] : no axillary lymphadenopathy [Normal Posterior Cervical Nodes] : no posterior cervical lymphadenopathy [Normal Anterior Cervical Nodes] : no anterior cervical lymphadenopathy [No CVA Tenderness] : no CVA  tenderness [No Spinal Tenderness] : no spinal tenderness [No Joint Swelling] : no joint swelling [Grossly Normal Strength/Tone] : grossly normal strength/tone [No Rash] : no rash [No Skin Lesions] : no skin lesions [Normal Gait] : normal gait [Coordination Grossly Intact] : coordination grossly intact [No Focal Deficits] : no focal deficits [Deep Tendon Reflexes (DTR)] : deep tendon reflexes were 2+ and symmetric [Speech Grossly Normal] : speech grossly normal [Memory Grossly Normal] : memory grossly normal [Normal Affect] : the affect was normal [Alert and Oriented x3] : oriented to person, place, and time [Normal Mood] : the mood was normal [Normal Insight/Judgement] : insight and judgment were intact [Kyphosis] : no kyphosis [Scoliosis] : no scoliosis

## 2018-12-11 NOTE — HISTORY OF PRESENT ILLNESS
[FreeTextEntry1] : CPE [de-identified] : The patient is being seen for a health maintenance evaluation.\par Exercise: The patient is not performing any exercise. She feels that her posture has changed and that she is walking with a forward posture. She spoke to Dr. Cutler.  \par Diet: Low salt and low fat\par Dental: Has had dental exam a few months ago (Dr. Salas). Also sees a periodontist\par Hearing: decreased, no hearing aids\par Vision. Glasses:\par             Checks:\par

## 2018-12-11 NOTE — REVIEW OF SYSTEMS
[Frequency] : frequency [Joint Pain] : joint pain [Muscle Pain] : muscle pain [Negative] : Heme/Lymph [Fever] : no fever [Chills] : no chills [Fatigue] : no fatigue [Hot Flashes] : no hot flashes [Night Sweats] : no night sweats [Nasal Discharge] : no nasal discharge [Sore Throat] : no sore throat [Chest Pain] : no chest pain [Palpitations] : no palpitations [Lower Ext Edema] : no lower extremity edema [Orthopnea] : no orthopnea [Paroysmal Nocturnal Dyspnea] : no paroysmal nocturnal dyspnea [Wheezing] : no wheezing [Nausea] : no nausea [Constipation] : no constipation [Diarrhea] : diarrhea [Vomiting] : no vomiting [Dysuria] : no dysuria [Incontinence] : no incontinence

## 2018-12-12 LAB
ALBUMIN SERPL ELPH-MCNC: 4.5 G/DL
ALP BLD-CCNC: 96 U/L
ALT SERPL-CCNC: 18 U/L
ANION GAP SERPL CALC-SCNC: 7 MMOL/L
AST SERPL-CCNC: 31 U/L
BASOPHILS # BLD AUTO: 0.06 K/UL
BASOPHILS NFR BLD AUTO: 0.7 %
BILIRUB SERPL-MCNC: 0.5 MG/DL
BUN SERPL-MCNC: 18 MG/DL
CALCIUM SERPL-MCNC: 9.9 MG/DL
CHLORIDE SERPL-SCNC: 100 MMOL/L
CHOLEST SERPL-MCNC: 241 MG/DL
CHOLEST/HDLC SERPL: 2.6 RATIO
CK SERPL-CCNC: 182 U/L
CO2 SERPL-SCNC: 31 MMOL/L
CREAT SERPL-MCNC: 0.66 MG/DL
EOSINOPHIL # BLD AUTO: 0.35 K/UL
EOSINOPHIL NFR BLD AUTO: 4.4 %
ERYTHROCYTE [SEDIMENTATION RATE] IN BLOOD BY WESTERGREN METHOD: 17 MM/HR
GLUCOSE SERPL-MCNC: 110 MG/DL
HCT VFR BLD CALC: 45.2 %
HDLC SERPL-MCNC: 93 MG/DL
HGB BLD-MCNC: 13.6 G/DL
IMM GRANULOCYTES NFR BLD AUTO: 0.2 %
LDLC SERPL CALC-MCNC: 136 MG/DL
LYMPHOCYTES # BLD AUTO: 0.97 K/UL
LYMPHOCYTES NFR BLD AUTO: 12.1 %
MAN DIFF?: NORMAL
MCHC RBC-ENTMCNC: 29.1 PG
MCHC RBC-ENTMCNC: 30.1 GM/DL
MCV RBC AUTO: 96.6 FL
MONOCYTES # BLD AUTO: 0.62 K/UL
MONOCYTES NFR BLD AUTO: 7.7 %
NEUTROPHILS # BLD AUTO: 6.02 K/UL
NEUTROPHILS NFR BLD AUTO: 74.9 %
PLATELET # BLD AUTO: 330 K/UL
POTASSIUM SERPL-SCNC: 4.3 MMOL/L
PROT SERPL-MCNC: 7.2 G/DL
RBC # BLD: 4.68 M/UL
RBC # FLD: 15 %
SODIUM SERPL-SCNC: 138 MMOL/L
TRIGL SERPL-MCNC: 58 MG/DL
WBC # FLD AUTO: 8.04 K/UL

## 2018-12-13 LAB
T4 FREE SERPL-MCNC: 1.7 NG/DL
TSH SERPL-ACNC: 1.52 UIU/ML

## 2019-02-22 ENCOUNTER — APPOINTMENT (OUTPATIENT)
Dept: INTERNAL MEDICINE | Facility: CLINIC | Age: 84
End: 2019-02-22
Payer: MEDICARE

## 2019-02-22 VITALS
HEART RATE: 96 BPM | DIASTOLIC BLOOD PRESSURE: 88 MMHG | HEIGHT: 56 IN | TEMPERATURE: 98.2 F | SYSTOLIC BLOOD PRESSURE: 152 MMHG

## 2019-02-22 PROCEDURE — 99213 OFFICE O/P EST LOW 20 MIN: CPT

## 2019-02-22 NOTE — PHYSICAL EXAM
[No Acute Distress] : no acute distress [Well Nourished] : well nourished [Well Developed] : well developed [Well-Appearing] : well-appearing [Normal Sclera/Conjunctiva] : normal sclera/conjunctiva [No JVD] : no jugular venous distention [Supple] : supple [No Lymphadenopathy] : no lymphadenopathy [No Respiratory Distress] : no respiratory distress  [Clear to Auscultation] : lungs were clear to auscultation bilaterally [No Accessory Muscle Use] : no accessory muscle use [Normal Rate] : normal rate  [Regular Rhythm] : with a regular rhythm [Normal S1, S2] : normal S1 and S2 [No Murmur] : no murmur heard [No Edema] : there was no peripheral edema [Normal Supraclavicular Nodes] : no supraclavicular lymphadenopathy [de-identified] : occasional productive cough

## 2019-02-22 NOTE — HISTORY OF PRESENT ILLNESS
[Cough] : cough [FreeTextEntry8] : She flew from [place] 2 days ago. Man in front was coughing. She developed cough last night non-productive. Fever, chills none. Runny nose a little. Sore throat-no, has sputum in the back of the throat and is seeing Dr. Khalil on 2/25. \par Nausea vomiting none. Diarrhea -none. Stomach a little queasy. \par Did not have a flu shot

## 2019-03-11 ENCOUNTER — APPOINTMENT (OUTPATIENT)
Dept: NEPHROLOGY | Facility: CLINIC | Age: 84
End: 2019-03-11
Payer: MEDICARE

## 2019-03-11 VITALS
HEART RATE: 90 BPM | HEIGHT: 56 IN | BODY MASS INDEX: 21.33 KG/M2 | DIASTOLIC BLOOD PRESSURE: 84 MMHG | SYSTOLIC BLOOD PRESSURE: 160 MMHG | WEIGHT: 94.8 LBS

## 2019-03-11 PROCEDURE — 99213 OFFICE O/P EST LOW 20 MIN: CPT

## 2019-03-11 NOTE — PHYSICAL EXAM
[General Appearance - Alert] : alert [General Appearance - In No Acute Distress] : in no acute distress [Sclera] : the sclera and conjunctiva were normal [Neck Appearance] : the appearance of the neck was normal [Auscultation Breath Sounds / Voice Sounds] : lungs were clear to auscultation bilaterally [Heart Rate And Rhythm] : heart rate was normal and rhythm regular [Heart Sounds] : normal S1 and S2 [Heart Sounds Pericardial Friction Rub] : no pericardial rub [Systolic grade ___/6] : A grade [unfilled]/6 systolic murmur was heard. [Edema] : there was no peripheral edema [Bowel Sounds] : normal bowel sounds [Abdomen Soft] : soft [Abdomen Tenderness] : non-tender [No CVA Tenderness] : no ~M costovertebral angle tenderness [Involuntary Movements] : no involuntary movements were seen [] : no rash [No Focal Deficits] : no focal deficits [Oriented To Time, Place, And Person] : oriented to person, place, and time [Affect] : the affect was normal [Mood] : the mood was normal

## 2019-03-11 NOTE — ASSESSMENT
[FreeTextEntry1] : 89 year-old with history of hypertension with periods of acceleration and a reactive component.\par Hypertension: BP high in office better at home monitoring\par Cautious salt restriction\par She was advised to continue to take felodipine as needed. Coreg twice a day and losartan. If need be can take felodipine 2.5

## 2019-03-30 ENCOUNTER — RX RENEWAL (OUTPATIENT)
Age: 84
End: 2019-03-30

## 2019-04-30 ENCOUNTER — NON-APPOINTMENT (OUTPATIENT)
Age: 84
End: 2019-04-30

## 2019-04-30 ENCOUNTER — APPOINTMENT (OUTPATIENT)
Dept: INTERNAL MEDICINE | Facility: CLINIC | Age: 84
End: 2019-04-30
Payer: MEDICARE

## 2019-04-30 VITALS
DIASTOLIC BLOOD PRESSURE: 98 MMHG | WEIGHT: 96 LBS | SYSTOLIC BLOOD PRESSURE: 200 MMHG | HEART RATE: 90 BPM | BODY MASS INDEX: 21.59 KG/M2 | HEIGHT: 56 IN

## 2019-04-30 DIAGNOSIS — M54.5 LOW BACK PAIN: ICD-10-CM

## 2019-04-30 DIAGNOSIS — R07.89 OTHER CHEST PAIN: ICD-10-CM

## 2019-04-30 PROCEDURE — 99214 OFFICE O/P EST MOD 30 MIN: CPT | Mod: 25

## 2019-04-30 PROCEDURE — 93000 ELECTROCARDIOGRAM COMPLETE: CPT

## 2019-04-30 RX ORDER — PROMETHAZINE HYDROCHLORIDE AND DEXTROMETHORPHAN HYDROBROMIDE ORAL SOLUTION 15; 6.25 MG/5ML; MG/5ML
6.25-15 SOLUTION ORAL
Qty: 1 | Refills: 0 | Status: DISCONTINUED | COMMUNITY
Start: 2019-02-22 | End: 2019-04-30

## 2019-04-30 RX ORDER — GUAIFENESIN, DEXTROMETHORPHAN HBR 600; 30 MG/1; MG/1
30-600 TABLET ORAL
Refills: 0 | Status: DISCONTINUED | COMMUNITY
Start: 2019-02-22 | End: 2019-04-30
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
## 2019-04-30 NOTE — ASSESSMENT
[FreeTextEntry1] : Atypical chest pain, nonexertional but character of pain is possibly suggestive of angina. \par Elevated BP with a very significant reactive component, and which is much lower at home.\par Aortic stenosis murmur\par

## 2019-04-30 NOTE — PHYSICAL EXAM
[No Acute Distress] : no acute distress [No JVD] : no jugular venous distention [Supple] : supple [No Respiratory Distress] : no respiratory distress  [Clear to Auscultation] : lungs were clear to auscultation bilaterally [Normal Rate] : normal [Rhythm Regular] : regular [Holodiastolic] : holodiastolic [Axilla] : the murmur was transmitted to the axilla [III] : a grade 3 [Crescendo-Decrescendo] : crescendo-decrescendo [Medium] : medium pitched [Blowing] : blowing [II] : a grade 2 [No Edema] : there was no peripheral edema [Normal S1] : abnormal S1 [Normal S2] : abnormal S2

## 2019-05-06 ENCOUNTER — APPOINTMENT (OUTPATIENT)
Dept: INTERNAL MEDICINE | Facility: CLINIC | Age: 84
End: 2019-05-06
Payer: MEDICARE

## 2019-05-06 PROCEDURE — 93306 TTE W/DOPPLER COMPLETE: CPT

## 2019-05-07 DIAGNOSIS — J20.9 ACUTE BRONCHITIS, UNSPECIFIED: ICD-10-CM

## 2019-07-01 ENCOUNTER — APPOINTMENT (OUTPATIENT)
Dept: INTERNAL MEDICINE | Facility: CLINIC | Age: 84
End: 2019-07-01
Payer: MEDICARE

## 2019-07-01 VITALS
BODY MASS INDEX: 20.7 KG/M2 | DIASTOLIC BLOOD PRESSURE: 90 MMHG | TEMPERATURE: 99 F | WEIGHT: 92 LBS | HEIGHT: 56 IN | HEART RATE: 84 BPM | SYSTOLIC BLOOD PRESSURE: 160 MMHG

## 2019-07-01 DIAGNOSIS — Z86.19 PERSONAL HISTORY OF OTHER INFECTIOUS AND PARASITIC DISEASES: ICD-10-CM

## 2019-07-01 LAB — S PYO AG SPEC QL IA: NEGATIVE

## 2019-07-01 PROCEDURE — 99213 OFFICE O/P EST LOW 20 MIN: CPT | Mod: 25

## 2019-07-01 PROCEDURE — 87880 STREP A ASSAY W/OPTIC: CPT | Mod: QW

## 2019-07-01 NOTE — HISTORY OF PRESENT ILLNESS
[FreeTextEntry8] : 1 week ago went to physical therapy but developed a severe backache less than 1/2 day afterwards. Did not go back to PT. Then noted mild loss of appetite particularly for meat; this got better and she regained some of the lost weight. Plans ot have some chicken tonight. Throat raspy a few days. Yesterday noted pain on swallowing, may have had a low grade temp. Today her throat hurts. No fever, no chills. Cough--none. Runny nose-negligible. Post nasal drip--none that she is aware of. Joint aches--nothing new. Appetite decreased. \par

## 2019-07-01 NOTE — PHYSICAL EXAM
[Well Developed] : well developed [Ill-Appearing] : ill-appearing [Normal Sclera/Conjunctiva] : normal sclera/conjunctiva [Normal Outer Ear/Nose] : the outer ears and nose were normal in appearance [Normal Oropharynx] : the oropharynx was normal [Normal TMs] : both tympanic membranes were normal [No JVD] : no jugular venous distention [Supple] : supple [No Respiratory Distress] : no respiratory distress  [Clear to Auscultation] : lungs were clear to auscultation bilaterally [Normal Rate] : normal rate  [Regular Rhythm] : with a regular rhythm [Normal S1, S2] : normal S1 and S2 [Normal Supraclavicular Nodes] : no supraclavicular lymphadenopathy [Normal Posterior Cervical Nodes] : no posterior cervical lymphadenopathy [Normal Anterior Cervical Nodes] : no anterior cervical lymphadenopathy

## 2019-08-14 ENCOUNTER — TRANSCRIPTION ENCOUNTER (OUTPATIENT)
Age: 84
End: 2019-08-14

## 2019-08-29 ENCOUNTER — RX RENEWAL (OUTPATIENT)
Age: 84
End: 2019-08-29

## 2019-09-09 ENCOUNTER — APPOINTMENT (OUTPATIENT)
Dept: NEPHROLOGY | Facility: CLINIC | Age: 84
End: 2019-09-09
Payer: MEDICARE

## 2019-09-09 VITALS
OXYGEN SATURATION: 98 % | BODY MASS INDEX: 21.33 KG/M2 | DIASTOLIC BLOOD PRESSURE: 91 MMHG | HEIGHT: 56 IN | HEART RATE: 77 BPM | WEIGHT: 94.8 LBS | SYSTOLIC BLOOD PRESSURE: 180 MMHG

## 2019-09-09 VITALS — DIASTOLIC BLOOD PRESSURE: 60 MMHG | HEART RATE: 70 BPM | SYSTOLIC BLOOD PRESSURE: 160 MMHG

## 2019-09-09 PROCEDURE — 99213 OFFICE O/P EST LOW 20 MIN: CPT

## 2019-09-09 NOTE — PHYSICAL EXAM
[Sclera] : the sclera and conjunctiva were normal [General Appearance - In No Acute Distress] : in no acute distress [General Appearance - Alert] : alert [Neck Appearance] : the appearance of the neck was normal [Auscultation Breath Sounds / Voice Sounds] : lungs were clear to auscultation bilaterally [Heart Rate And Rhythm] : heart rate was normal and rhythm regular [Heart Sounds] : normal S1 and S2 [Heart Sounds Pericardial Friction Rub] : no pericardial rub [Systolic grade ___/6] : A grade [unfilled]/6 systolic murmur was heard. [Bowel Sounds] : normal bowel sounds [Abdomen Soft] : soft [Edema] : there was no peripheral edema [Abdomen Tenderness] : non-tender [] : no rash [Involuntary Movements] : no involuntary movements were seen [No Focal Deficits] : no focal deficits [Oriented To Time, Place, And Person] : oriented to person, place, and time [Mood] : the mood was normal [Affect] : the affect was normal

## 2019-09-09 NOTE — HISTORY OF PRESENT ILLNESS
[FreeTextEntry1] : 89-year-old female here for followup of hypertension. \par She had gone to [place] for her great grandchild bday\par BP at home was 130-150/80 and occasionally as high as 180s

## 2019-09-09 NOTE — ASSESSMENT
[FreeTextEntry1] : 89 year-old with history of hypertension with periods of acceleration and a reactive component.\par Hypertension: BP high in office. Continue home monitoring\par Cautious salt restriction\par She was advised to continue to take felodipine 2.5mg as needed. \par Coreg twice a day and losartan.

## 2019-10-17 ENCOUNTER — APPOINTMENT (OUTPATIENT)
Dept: INTERNAL MEDICINE | Facility: CLINIC | Age: 84
End: 2019-10-17
Payer: MEDICARE

## 2019-10-17 ENCOUNTER — NON-APPOINTMENT (OUTPATIENT)
Age: 84
End: 2019-10-17

## 2019-10-17 VITALS — SYSTOLIC BLOOD PRESSURE: 180 MMHG | DIASTOLIC BLOOD PRESSURE: 82 MMHG

## 2019-10-17 VITALS
HEART RATE: 80 BPM | BODY MASS INDEX: 21.15 KG/M2 | HEIGHT: 56 IN | SYSTOLIC BLOOD PRESSURE: 160 MMHG | WEIGHT: 94 LBS | DIASTOLIC BLOOD PRESSURE: 90 MMHG

## 2019-10-17 PROCEDURE — 99214 OFFICE O/P EST MOD 30 MIN: CPT | Mod: 25

## 2019-10-17 PROCEDURE — 93000 ELECTROCARDIOGRAM COMPLETE: CPT

## 2019-10-17 PROCEDURE — 36415 COLL VENOUS BLD VENIPUNCTURE: CPT

## 2019-10-17 NOTE — ASSESSMENT
[FreeTextEntry1] : Severe aortic stenosis based on the echo of May 2019. She may have become hypotensive when she had the event on Yom Kippur, at least transiently--exacerbated by the AS gradient. Most commonly this is related to a vasovagal--full bladder, warm [place], stood up to go to bathroom after sitting for awhile. ECG is normal.

## 2019-10-17 NOTE — PHYSICAL EXAM
[No Acute Distress] : no acute distress [No JVD] : no jugular venous distention [Supple] : supple [No Respiratory Distress] : no respiratory distress  [No Accessory Muscle Use] : no accessory muscle use [Clear to Auscultation] : lungs were clear to auscultation bilaterally [Normal Rate] : normal rate  [Regular Rhythm] : with a regular rhythm [Normal S1, S2] : normal S1 and S2 [No Murmur] : no murmur heard [No Edema] : there was no peripheral edema [de-identified] : slight ereythema 2nd right MCP, nmild tenderness. The joint is thickened and hypertrophied.

## 2019-10-17 NOTE — RESULTS/DATA
[Normal] : The 12 - lead ECG is normal [NSR] : normal sinus rhythm [P Waves Normal] : the P wave is normal [ECG Intervals [place].] : [place] interval is normal [Normal QRS] : the QRS is normal [ECG Axis] : the QRS axis is normal [Normal ST Segments] : the ST segments are normal [ECG T. Waves] : normal [No Interval Change] : no interval change

## 2019-10-17 NOTE — PLAN
[FreeTextEntry1] : Carotid Doppler\par Echo was done in May. \par Stress test--she cannot manage a treadmill test, and she does not want (understandably) a pharmacologic test. \par ECG was normal. She does not need a Holter. \par Blood work, although she had some blood work by  about a week before.

## 2019-10-17 NOTE — HISTORY OF PRESENT ILLNESS
[FreeTextEntry1] : follow up [de-identified] : Pain in right  second finger MCP since this AM with some mild erythema. \par On Yom Kippur, , she ate as usual. She had a near syncopal episode  while walking to the bathroom. No actual syncope. Lasted about half hour or less. She had some water and after a while felt OK. No recurrences since then. Took her medicines as per usual. BP was high when she got home possibly 160 systolic.\par She has had chest discomfort once while carrying groceries, May feel it in her upper arm.

## 2019-10-18 LAB
ANION GAP SERPL CALC-SCNC: 11 MMOL/L
BASOPHILS # BLD AUTO: 0.09 K/UL
BASOPHILS NFR BLD AUTO: 1.2 %
BUN SERPL-MCNC: 23 MG/DL
CALCIUM SERPL-MCNC: 9.8 MG/DL
CHLORIDE SERPL-SCNC: 102 MMOL/L
CO2 SERPL-SCNC: 27 MMOL/L
CREAT SERPL-MCNC: 0.65 MG/DL
EOSINOPHIL # BLD AUTO: 0.56 K/UL
EOSINOPHIL NFR BLD AUTO: 7.3 %
GLUCOSE SERPL-MCNC: 143 MG/DL
HCT VFR BLD CALC: 42.1 %
HGB BLD-MCNC: 13 G/DL
IMM GRANULOCYTES NFR BLD AUTO: 0.3 %
LYMPHOCYTES # BLD AUTO: 0.64 K/UL
LYMPHOCYTES NFR BLD AUTO: 8.4 %
MAGNESIUM SERPL-MCNC: 2.2 MG/DL
MAN DIFF?: NORMAL
MCHC RBC-ENTMCNC: 29.7 PG
MCHC RBC-ENTMCNC: 30.9 GM/DL
MCV RBC AUTO: 96.1 FL
MONOCYTES # BLD AUTO: 0.85 K/UL
MONOCYTES NFR BLD AUTO: 11.2 %
NEUTROPHILS # BLD AUTO: 5.46 K/UL
NEUTROPHILS NFR BLD AUTO: 71.6 %
PLATELET # BLD AUTO: 259 K/UL
POTASSIUM SERPL-SCNC: 4.2 MMOL/L
RBC # BLD: 4.38 M/UL
RBC # FLD: 14.1 %
SODIUM SERPL-SCNC: 140 MMOL/L
WBC # FLD AUTO: 7.62 K/UL

## 2019-10-28 ENCOUNTER — APPOINTMENT (OUTPATIENT)
Dept: INTERNAL MEDICINE | Facility: CLINIC | Age: 84
End: 2019-10-28
Payer: MEDICARE

## 2019-10-28 DIAGNOSIS — R55 SYNCOPE AND COLLAPSE: ICD-10-CM

## 2019-10-28 DIAGNOSIS — M31.6 OTHER GIANT CELL ARTERITIS: ICD-10-CM

## 2019-10-28 PROCEDURE — 93880 EXTRACRANIAL BILAT STUDY: CPT

## 2019-10-29 PROBLEM — M31.6 TEMPORAL ARTERITIS: Status: ACTIVE | Noted: 2018-11-29

## 2019-10-29 PROBLEM — R55 NEAR SYNCOPE: Status: ACTIVE | Noted: 2019-10-17

## 2019-11-18 ENCOUNTER — RX RENEWAL (OUTPATIENT)
Age: 84
End: 2019-11-18

## 2019-12-26 ENCOUNTER — RX RENEWAL (OUTPATIENT)
Age: 84
End: 2019-12-26

## 2019-12-26 ENCOUNTER — APPOINTMENT (OUTPATIENT)
Dept: INTERNAL MEDICINE | Facility: CLINIC | Age: 84
End: 2019-12-26
Payer: MEDICARE

## 2019-12-26 VITALS
DIASTOLIC BLOOD PRESSURE: 80 MMHG | HEART RATE: 84 BPM | BODY MASS INDEX: 20.7 KG/M2 | WEIGHT: 92 LBS | SYSTOLIC BLOOD PRESSURE: 122 MMHG | HEIGHT: 56 IN

## 2019-12-26 DIAGNOSIS — B02.29 OTHER POSTHERPETIC NERVOUS SYSTEM INVOLVEMENT: ICD-10-CM

## 2019-12-26 PROCEDURE — 99213 OFFICE O/P EST LOW 20 MIN: CPT

## 2019-12-26 NOTE — ASSESSMENT
[FreeTextEntry1] : herpetic neuritis causing itching and pain which is affecting her stamina, she is not sleeping well

## 2019-12-26 NOTE — PHYSICAL EXAM
[Well Developed] : well developed [No Acute Distress] : no acute distress [Normal Voice/Communication] : normal voice/communication [No Respiratory Distress] : no respiratory distress  [Normal Rate] : normal rate  [Regular Rhythm] : with a regular rhythm [Normal S1, S2] : normal S1 and S2 [No Murmur] : no murmur heard [de-identified] : healed herpetic lesions on her left him clustered

## 2019-12-26 NOTE — HISTORY OF PRESENT ILLNESS
[FreeTextEntry8] : Has a severe itch related to the zoster she had a month ago. "It's unbearable". Has been taking creams, Allegra, haven't helped. \par Reason for being here is that she feels weak. She has trouble sleeping. \par BP at home has been very good. She spoke to Dr. Castro, Increased felodipine to 4 tabs per day 10 mg, presumed due to the pain from the shingles. Stamina is decreased. Using Glucerna supplement.  She has lost 2 lbs since last visit.

## 2020-01-14 ENCOUNTER — APPOINTMENT (OUTPATIENT)
Dept: NEPHROLOGY | Facility: CLINIC | Age: 85
End: 2020-01-14

## 2020-03-02 ENCOUNTER — APPOINTMENT (OUTPATIENT)
Dept: NEPHROLOGY | Facility: CLINIC | Age: 85
End: 2020-03-02

## 2020-07-20 ENCOUNTER — APPOINTMENT (OUTPATIENT)
Dept: NEPHROLOGY | Facility: CLINIC | Age: 85
End: 2020-07-20
Payer: MEDICARE

## 2020-07-20 VITALS — HEART RATE: 78 BPM | SYSTOLIC BLOOD PRESSURE: 156 MMHG | DIASTOLIC BLOOD PRESSURE: 60 MMHG

## 2020-07-20 VITALS
HEIGHT: 56 IN | BODY MASS INDEX: 21.15 KG/M2 | SYSTOLIC BLOOD PRESSURE: 185 MMHG | WEIGHT: 94 LBS | DIASTOLIC BLOOD PRESSURE: 116 MMHG | OXYGEN SATURATION: 97 % | HEART RATE: 88 BPM

## 2020-07-20 PROCEDURE — 99213 OFFICE O/P EST LOW 20 MIN: CPT

## 2020-07-20 RX ORDER — GABAPENTIN 100 MG/1
100 CAPSULE ORAL
Qty: 30 | Refills: 3 | Status: DISCONTINUED | COMMUNITY
Start: 2019-12-26 | End: 2020-07-20

## 2020-07-20 RX ORDER — OMEPRAZOLE MAGNESIUM 20 MG/1
20 TABLET, DELAYED RELEASE ORAL DAILY
Refills: 0 | Status: DISCONTINUED | COMMUNITY
Start: 2019-04-30 | End: 2020-07-20

## 2020-07-20 NOTE — PHYSICAL EXAM
[General Appearance - In No Acute Distress] : in no acute distress [General Appearance - Alert] : alert [Sclera] : the sclera and conjunctiva were normal [Neck Appearance] : the appearance of the neck was normal [Auscultation Breath Sounds / Voice Sounds] : lungs were clear to auscultation bilaterally [Heart Rate And Rhythm] : heart rate was normal and rhythm regular [Heart Sounds Pericardial Friction Rub] : no pericardial rub [Heart Sounds] : normal S1 and S2 [Systolic grade ___/6] : A grade [unfilled]/6 systolic murmur was heard. [Edema] : there was no peripheral edema [Bowel Sounds] : normal bowel sounds [Abdomen Soft] : soft [Involuntary Movements] : no involuntary movements were seen [Abdomen Tenderness] : non-tender [] : no rash [Affect] : the affect was normal [No Focal Deficits] : no focal deficits [Oriented To Time, Place, And Person] : oriented to person, place, and time [Mood] : the mood was normal

## 2020-07-20 NOTE — ASSESSMENT
[FreeTextEntry1] : 89 year-old with history of hypertension with periods of acceleration and a reactive component.\par Hypertension: BP improved in office. Continue home monitoring which was better this am \par Salt restriction\par She was advised to continue to take felodipine 2.5mg as needed. \par Coreg twice a day and losartan. \par

## 2020-07-20 NOTE — HISTORY OF PRESENT ILLNESS
[FreeTextEntry1] : 89-year-old female here for followup of hypertension. \par She has been doing her best on her own and has some friends who have been helpful for shopping for her \par She has another great grandchild on the way\par She had labs in jan which was stable\par Going to see her endo next month\par Home BPs are variable at times but knows to take felodipine for the spikes

## 2020-09-16 ENCOUNTER — APPOINTMENT (OUTPATIENT)
Dept: INTERNAL MEDICINE | Facility: CLINIC | Age: 85
End: 2020-09-16
Payer: MEDICARE

## 2020-09-16 VITALS
SYSTOLIC BLOOD PRESSURE: 202 MMHG | WEIGHT: 91 LBS | HEART RATE: 78 BPM | HEIGHT: 56 IN | DIASTOLIC BLOOD PRESSURE: 80 MMHG | BODY MASS INDEX: 20.47 KG/M2

## 2020-09-16 DIAGNOSIS — M19.90 UNSPECIFIED OSTEOARTHRITIS, UNSPECIFIED SITE: ICD-10-CM

## 2020-09-16 DIAGNOSIS — Z23 ENCOUNTER FOR IMMUNIZATION: ICD-10-CM

## 2020-09-16 PROCEDURE — 36415 COLL VENOUS BLD VENIPUNCTURE: CPT

## 2020-09-16 PROCEDURE — 99213 OFFICE O/P EST LOW 20 MIN: CPT | Mod: 25

## 2020-09-16 RX ORDER — OMEGA-3/DHA/EPA/FISH OIL 1200 MG
1200 CAPSULE ORAL DAILY
Refills: 0 | Status: DISCONTINUED | COMMUNITY
End: 2020-09-16

## 2020-09-16 RX ORDER — LIDOCAINE 5% 700 MG/1
5 PATCH TOPICAL
Qty: 10 | Refills: 2 | Status: DISCONTINUED | COMMUNITY
Start: 2019-12-26 | End: 2020-09-16

## 2020-09-16 NOTE — HISTORY OF PRESENT ILLNESS
[FreeTextEntry1] : Wants evaluation for gout. [de-identified] : Pain in the knee two weeks ago went to [facility], had an x-ray which revealed arthritis. Refused a cortisone injection. Then developed pain in the ankle and foot. She went to podiatrist and was told she had gout. She took naproxen with relief. \par She is concerned if she has gout. No changes in diet and no new medications. Not drinking a lot of water. Never had gout attack before. Not on diuretic therapy. \par Uses felodipine only when BP goes higher than 200. Took no felodipine today.\par No postherpetic neuralgia symptoms most of the time but still gets pain rarely--"needle pain"\par She sees Dr. Cutler for endocrine and Dr. Castro for renal/hypertension. Hgb A1c in August was 6.3%, CBC  and TSH were normal.

## 2020-09-16 NOTE — PHYSICAL EXAM
[No Acute Distress] : no acute distress [Normal Voice/Communication] : normal voice/communication [No JVD] : no jugular venous distention [No Respiratory Distress] : no respiratory distress  [No Accessory Muscle Use] : no accessory muscle use [Clear to Auscultation] : lungs were clear to auscultation bilaterally [Normal Percussion] : the chest was normal to percussion [Normal Rate] : normal rate  [Regular Rhythm] : with a regular rhythm [Normal S1, S2] : normal S1 and S2 [No Murmur] : no murmur heard [No Carotid Bruits] : no carotid bruits [No Edema] : there was no peripheral edema [de-identified] : No joint swelling or erythema.

## 2020-09-17 LAB
ERYTHROCYTE [SEDIMENTATION RATE] IN BLOOD BY WESTERGREN METHOD: 29 MM/HR
RHEUMATOID FACT SER QL: <10 IU/ML
URATE SERPL-MCNC: 4.3 MG/DL

## 2020-09-18 LAB — ANACR T: NEGATIVE

## 2020-11-20 NOTE — ED ADULT NURSE NOTE - GASTROINTESTINAL ASSESSMENT
Alert-The patient is alert, awake and responds to voice. The patient is oriented to time, place, and person. The triage nurse is able to obtain subjective information.
WDL

## 2020-12-21 PROBLEM — Z86.19 HISTORY OF VIRAL PHARYNGITIS: Status: RESOLVED | Noted: 2019-07-01 | Resolved: 2020-12-21

## 2021-01-25 ENCOUNTER — APPOINTMENT (OUTPATIENT)
Dept: NEPHROLOGY | Facility: CLINIC | Age: 86
End: 2021-01-25
Payer: MEDICARE

## 2021-01-25 VITALS
DIASTOLIC BLOOD PRESSURE: 90 MMHG | WEIGHT: 90.39 LBS | SYSTOLIC BLOOD PRESSURE: 204 MMHG | HEART RATE: 80 BPM | TEMPERATURE: 97.3 F | BODY MASS INDEX: 20.27 KG/M2 | OXYGEN SATURATION: 99 %

## 2021-01-25 VITALS — HEART RATE: 78 BPM | SYSTOLIC BLOOD PRESSURE: 150 MMHG | DIASTOLIC BLOOD PRESSURE: 60 MMHG

## 2021-01-25 PROCEDURE — 99213 OFFICE O/P EST LOW 20 MIN: CPT

## 2021-01-25 NOTE — PHYSICAL EXAM
[General Appearance - Alert] : alert [General Appearance - In No Acute Distress] : in no acute distress [Sclera] : the sclera and conjunctiva were normal [Neck Appearance] : the appearance of the neck was normal [Auscultation Breath Sounds / Voice Sounds] : lungs were clear to auscultation bilaterally [Heart Sounds] : normal S1 and S2 [Heart Rate And Rhythm] : heart rate was normal and rhythm regular [Heart Sounds Pericardial Friction Rub] : no pericardial rub [Systolic grade ___/6] : A grade [unfilled]/6 systolic murmur was heard. [Edema] : there was no peripheral edema [Bowel Sounds] : normal bowel sounds [Abdomen Soft] : soft [Abdomen Tenderness] : non-tender [Involuntary Movements] : no involuntary movements were seen [] : no rash [No Focal Deficits] : no focal deficits [Oriented To Time, Place, And Person] : oriented to person, place, and time [Affect] : the affect was normal [Mood] : the mood was normal

## 2021-01-25 NOTE — HISTORY OF PRESENT ILLNESS
[FreeTextEntry1] : 89-year-old female here for followup of hypertension. \par She has another great grandchild in [place] named Steviea\par She started fosamax with her endo\par She has been intermittently taking felodipine\par SHe is sad about being alone and not able to travel

## 2021-01-25 NOTE — ASSESSMENT
[FreeTextEntry1] : 89 year-old with history of hypertension with periods of acceleration and a reactive component.\par Hypertension: BP improved in office. Continue home monitoring which was better this am \par Salt restriction\par She was advised to continue to take felodipine 2.5mg as needed. \par Coreg twice a day and losartan maintenance.\par Osteoporosis on fosamax with Dr Cutler\par

## 2021-04-27 ENCOUNTER — APPOINTMENT (OUTPATIENT)
Dept: INTERNAL MEDICINE | Facility: CLINIC | Age: 86
End: 2021-04-27
Payer: MEDICARE

## 2021-04-27 ENCOUNTER — NON-APPOINTMENT (OUTPATIENT)
Age: 86
End: 2021-04-27

## 2021-04-27 VITALS
DIASTOLIC BLOOD PRESSURE: 93 MMHG | WEIGHT: 90 LBS | TEMPERATURE: 97.4 F | HEIGHT: 56 IN | BODY MASS INDEX: 20.24 KG/M2 | SYSTOLIC BLOOD PRESSURE: 164 MMHG | HEART RATE: 82 BPM

## 2021-04-27 VITALS — SYSTOLIC BLOOD PRESSURE: 148 MMHG | DIASTOLIC BLOOD PRESSURE: 100 MMHG

## 2021-04-27 LAB
BILIRUB UR QL STRIP: NEGATIVE
CLARITY UR: CLEAR
COLLECTION METHOD: NORMAL
GLUCOSE UR-MCNC: NEGATIVE
HCG UR QL: 0.2 EU/DL
HGB UR QL STRIP.AUTO: NORMAL
KETONES UR-MCNC: NEGATIVE
LEUKOCYTE ESTERASE UR QL STRIP: NORMAL
NITRITE UR QL STRIP: NEGATIVE
PH UR STRIP: 7.5
PROT UR STRIP-MCNC: NEGATIVE
SP GR UR STRIP: 1.02

## 2021-04-27 PROCEDURE — 99213 OFFICE O/P EST LOW 20 MIN: CPT

## 2021-04-27 NOTE — PHYSICAL EXAM
[No Acute Distress] : no acute distress [Well Nourished] : well nourished [No Respiratory Distress] : no respiratory distress  [No Accessory Muscle Use] : no accessory muscle use [Clear to Auscultation] : lungs were clear to auscultation bilaterally [Normal Percussion] : the chest was normal to percussion [Regular Rhythm] : with a regular rhythm [S1 Diminished] : was diminished [S2 Diminished] : was diminished [II] : a grade 2 [Holosystolic] : holosystolic [Medium] : medium pitched [Musical] : musical [LSB] : the murmur was transmitted to the LSB [No Edema] : there was no peripheral edema [Normal S2] : abnormal S2 [Normal S1] : abnormal S1

## 2021-04-27 NOTE — HISTORY OF PRESENT ILLNESS
[FreeTextEntry1] : follow up [de-identified] : Went for second COVID immunization 4/5/2021, had the  injection but BP was high and was sent to [facility], BP systolic 200/116. Labs were OK. 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 \par She was  discharged 4 hours later. She feels OK now. Has fatigue. Wants to go to see [place] but has low energy. Sees Dr. Cutler regularly and also sees Dr. Castro for BP control. \par She has osteoporosis and started to take Fosamax- two doses and didn't feel well, She was offered Prolia and will discuss with Dr. Cutler in June 2021. She is willing to try this therapy.

## 2021-04-27 NOTE — ASSESSMENT
[FreeTextEntry1] : Status quo--elevated BP which may reflect pseudohypertension. She is chronically at this level and may become symptomatic if she drops below the 140 range. . \par Osteoporosis.\par

## 2021-04-27 NOTE — RESULTS/DATA
[Normal] : The 12 - lead ECG is normal [NSR] : normal sinus rhythm [P Waves Normal] : the P wave is normal [Normal QRS] : the QRS is normal [ECG Axis] : the QRS axis is normal [Normal ST Segments] : the ST segments are normal [ECG T. Waves] : normal [No Interval Change] : no interval change

## 2021-07-13 ENCOUNTER — APPOINTMENT (OUTPATIENT)
Dept: NEPHROLOGY | Facility: CLINIC | Age: 86
End: 2021-07-13
Payer: MEDICARE

## 2021-07-13 VITALS
DIASTOLIC BLOOD PRESSURE: 80 MMHG | WEIGHT: 90 LBS | SYSTOLIC BLOOD PRESSURE: 160 MMHG | HEART RATE: 70 BPM | BODY MASS INDEX: 20.24 KG/M2 | HEIGHT: 56 IN

## 2021-07-13 PROCEDURE — 99214 OFFICE O/P EST MOD 30 MIN: CPT

## 2021-07-13 RX ORDER — ALENDRONATE SODIUM 35 MG/1
TABLET ORAL
Refills: 0 | Status: DISCONTINUED | COMMUNITY
End: 2021-07-13

## 2021-07-13 NOTE — HISTORY OF PRESENT ILLNESS
[FreeTextEntry1] : 89 year-old female here for followup of hypertension. \par She went to [place] to see daughter- thinking about moving down there to be closer to her\par Thinking about prolia for osteo with DR Cutler\par She has been intermittently taking felodipine\par

## 2021-07-13 NOTE — PHYSICAL EXAM
[General Appearance - Alert] : alert [General Appearance - In No Acute Distress] : in no acute distress [Sclera] : the sclera and conjunctiva were normal [Neck Appearance] : the appearance of the neck was normal [Auscultation Breath Sounds / Voice Sounds] : lungs were clear to auscultation bilaterally [Heart Rate And Rhythm] : heart rate was normal and rhythm regular [Heart Sounds] : normal S1 and S2 [Heart Sounds Pericardial Friction Rub] : no pericardial rub [Systolic grade ___/6] : A grade [unfilled]/6 systolic murmur was heard. [Edema] : there was no peripheral edema [Bowel Sounds] : normal bowel sounds [Abdomen Soft] : soft [Abdomen Tenderness] : non-tender [Involuntary Movements] : no involuntary movements were seen [] : no rash [No Focal Deficits] : no focal deficits [Oriented To Time, Place, And Person] : oriented to person, place, and time [Affect] : the affect was normal [Mood] : the mood was normal

## 2021-07-13 NOTE — ASSESSMENT
[FreeTextEntry1] : 89 year-old with history of hypertension with periods of acceleration and a reactive component.\par Hypertension: BP higher than goal- reviewed meds administration timing\par Continue home monitoring and take felodipine today\par Salt restriction\par Coreg twice a day and losartan maintenance.\par Cardio followup with new MD since Dr Tafoya is retiring \par Osteoporosis possible prolia with Dr Cutler\par

## 2021-07-15 ENCOUNTER — APPOINTMENT (OUTPATIENT)
Dept: NEPHROLOGY | Facility: CLINIC | Age: 86
End: 2021-07-15

## 2021-08-30 ENCOUNTER — RX RENEWAL (OUTPATIENT)
Age: 86
End: 2021-08-30

## 2021-10-22 ENCOUNTER — APPOINTMENT (OUTPATIENT)
Dept: NEPHROLOGY | Facility: CLINIC | Age: 86
End: 2021-10-22
Payer: MEDICARE

## 2021-10-22 VITALS
HEIGHT: 56 IN | WEIGHT: 88.18 LBS | HEART RATE: 70 BPM | SYSTOLIC BLOOD PRESSURE: 170 MMHG | BODY MASS INDEX: 19.84 KG/M2 | DIASTOLIC BLOOD PRESSURE: 84 MMHG

## 2021-10-22 PROCEDURE — 99214 OFFICE O/P EST MOD 30 MIN: CPT

## 2021-10-22 NOTE — HISTORY OF PRESENT ILLNESS
[FreeTextEntry1] : 89 year-old female here for followup of hypertension. \par Thinking about prolia for osteo with DR Cutler\par BP variable with spikes\par Dr Cutler advised cards followup for echo\par

## 2021-10-22 NOTE — ASSESSMENT
[FreeTextEntry1] : 89 year-old with history of hypertension with periods of acceleration and a reactive component.\par Hypertension: BP higher than goal- stress from the [employer] ride today\par Continue home monitoring and take felodipine today\par Salt restriction\par Coreg twice a day and losartan maintenance.\par Cardio followup with new MD for echo and AS eval\par Osteoporosis possible prolia with Dr Cutler\par

## 2021-12-10 ENCOUNTER — APPOINTMENT (OUTPATIENT)
Dept: INTERNAL MEDICINE | Facility: CLINIC | Age: 86
End: 2021-12-10
Payer: MEDICARE

## 2021-12-10 ENCOUNTER — APPOINTMENT (OUTPATIENT)
Dept: CARDIOLOGY | Facility: CLINIC | Age: 86
End: 2021-12-10
Payer: MEDICARE

## 2021-12-10 VITALS
WEIGHT: 88 LBS | DIASTOLIC BLOOD PRESSURE: 80 MMHG | HEART RATE: 74 BPM | BODY MASS INDEX: 19.8 KG/M2 | HEIGHT: 56 IN | SYSTOLIC BLOOD PRESSURE: 142 MMHG

## 2021-12-10 DIAGNOSIS — E78.5 HYPERLIPIDEMIA, UNSPECIFIED: ICD-10-CM

## 2021-12-10 PROCEDURE — 99204 OFFICE O/P NEW MOD 45 MIN: CPT

## 2021-12-10 PROCEDURE — 93306 TTE W/DOPPLER COMPLETE: CPT

## 2021-12-10 NOTE — PHYSICAL EXAM

## 2021-12-10 NOTE — DISCUSSION/SUMMARY
[FreeTextEntry1] : Severe AS: Echo showing gradients in severe range. She does note extreme fatigue, no cardiac symptoms. Discussed option of TAVR with her. Will send to Dr. Thomas for further discussion to assess her candidacy for TAVR. She is interested in discussing further with him but is not yet willing to commit to having it done\par \par HTN- Acceptable for her. Cont 3 drug regimen\par \par HLD- Cont statin, last lipids acceptable in 2020. Cont statin\par \par RV 3 months

## 2021-12-10 NOTE — HISTORY OF PRESENT ILLNESS
[FreeTextEntry1] : 89F with HTN, severe AS who presents for cardiac follow up. Previously following with Dr. Tafoya.\par \par She overall feels OK. Feels very fatigued. \par Noted with severe AS by SOO on an echo from May of 2019, moderate by gradients\par On echo today, her gradients have increased into severe range, unchanged SOO\par \par She overall feels OK, denies CP, dyspnea, lightheadedness, syncope\par Tripped and fell on a piece of concrete 3 weeks ago, fractured her L thumb, wearing a brace. Following with a hand specialist. \par \par Never smoker. Lives alone. Her  and son passed away. Has a daughter in [place]. Has nieces and nephews locally. \par \par ECG: SR, no ST-T wave changes\par TTE: Normal LV function, severe AS (69, 43, 0.8)\par \par Asa 81mg, Carvedilol 25mg BID, Felodipine 2.5mg prn, Losartan 100mg

## 2021-12-21 ENCOUNTER — APPOINTMENT (OUTPATIENT)
Dept: CARDIOTHORACIC SURGERY | Facility: CLINIC | Age: 86
End: 2021-12-21
Payer: MEDICARE

## 2021-12-21 ENCOUNTER — NON-APPOINTMENT (OUTPATIENT)
Age: 86
End: 2021-12-21

## 2021-12-21 VITALS
SYSTOLIC BLOOD PRESSURE: 204 MMHG | DIASTOLIC BLOOD PRESSURE: 79 MMHG | WEIGHT: 87 LBS | BODY MASS INDEX: 19.57 KG/M2 | HEIGHT: 56 IN | RESPIRATION RATE: 13 BRPM | HEART RATE: 82 BPM | TEMPERATURE: 98 F | OXYGEN SATURATION: 99 %

## 2021-12-21 VITALS
TEMPERATURE: 97.3 F | RESPIRATION RATE: 16 BRPM | OXYGEN SATURATION: 99 % | SYSTOLIC BLOOD PRESSURE: 204 MMHG | BODY MASS INDEX: 19.57 KG/M2 | DIASTOLIC BLOOD PRESSURE: 79 MMHG | HEART RATE: 82 BPM | HEIGHT: 56 IN | WEIGHT: 87 LBS

## 2021-12-21 DIAGNOSIS — I35.0 NONRHEUMATIC AORTIC (VALVE) STENOSIS: ICD-10-CM

## 2021-12-21 DIAGNOSIS — R06.02 SHORTNESS OF BREATH: ICD-10-CM

## 2021-12-21 DIAGNOSIS — R53.83 OTHER FATIGUE: ICD-10-CM

## 2021-12-21 LAB
ALBUMIN SERPL ELPH-MCNC: 4 G/DL
ALP BLD-CCNC: 74 U/L
ALT SERPL-CCNC: 17 U/L
ANION GAP SERPL CALC-SCNC: 12 MMOL/L
AST SERPL-CCNC: 24 U/L
BASOPHILS # BLD AUTO: 0.06 K/UL
BASOPHILS NFR BLD AUTO: 0.6 %
BILIRUB SERPL-MCNC: 0.3 MG/DL
BUN SERPL-MCNC: 24 MG/DL
CALCIUM SERPL-MCNC: 9.3 MG/DL
CHLORIDE SERPL-SCNC: 102 MMOL/L
CO2 SERPL-SCNC: 27 MMOL/L
CREAT SERPL-MCNC: 0.71 MG/DL
EOSINOPHIL # BLD AUTO: 0.24 K/UL
EOSINOPHIL NFR BLD AUTO: 2.2 %
GLUCOSE SERPL-MCNC: 138 MG/DL
HCT VFR BLD CALC: 40.3 %
HGB BLD-MCNC: 12.6 G/DL
IMM GRANULOCYTES NFR BLD AUTO: 0.7 %
LYMPHOCYTES # BLD AUTO: 0.64 K/UL
LYMPHOCYTES NFR BLD AUTO: 6 %
MAN DIFF?: NORMAL
MCHC RBC-ENTMCNC: 29.4 PG
MCHC RBC-ENTMCNC: 31.3 GM/DL
MCV RBC AUTO: 94.2 FL
MONOCYTES # BLD AUTO: 1.01 K/UL
MONOCYTES NFR BLD AUTO: 9.4 %
NEUTROPHILS # BLD AUTO: 8.67 K/UL
NEUTROPHILS NFR BLD AUTO: 81.1 %
NT-PROBNP SERPL-MCNC: 554 PG/ML
PLATELET # BLD AUTO: 319 K/UL
POTASSIUM SERPL-SCNC: 4 MMOL/L
PROT SERPL-MCNC: 6.9 G/DL
RBC # BLD: 4.28 M/UL
RBC # FLD: 13.1 %
SODIUM SERPL-SCNC: 141 MMOL/L
WBC # FLD AUTO: 10.69 K/UL

## 2021-12-21 PROCEDURE — 99204 OFFICE O/P NEW MOD 45 MIN: CPT

## 2021-12-21 PROCEDURE — 93000 ELECTROCARDIOGRAM COMPLETE: CPT

## 2021-12-21 PROCEDURE — 99203 OFFICE O/P NEW LOW 30 MIN: CPT

## 2021-12-21 NOTE — ASSESSMENT
[FreeTextEntry1] : Mrs. Larsen is a 89 year old female with past medical history that includes GERD, HTN, HLD, T2DM, neuropathy, temporal arteritis arthritis and aortic valve disease. She was referred by Dr. Bipin Olivas for evaluation of aortic stenosis. Her endocrinologist noted a worsening cardiac murmur and referred her to Dr. Olivas for evaluation. She reports several months of increasing fatigue (NYHA II) along with decline in overall stamina. She has no dyspnea, angina, PND, orthopnea, dizziness, or syncope. She suffered a mechanical fall in November fracturing her left thumb.. She is  and lives alone. Her son passed away, and her daughter lives in [place]. \par \par I had the pleasure of evaluating your patient,Ms. RENETTA LARSEN who is a 89 year F with heart failure symptoms of dyspnea on exertion (NYHA class II).\par \par  Ms. RENETTA LARSEN has severe AS with pG 68 mmHg and mG 48 mmHg, AoV 4.1 m/sec and an SOO of 0.76 cm²).  The risks and benefits of both SAVR and JORY have been explained and the patient would like to proceed with further workup and consideration for JORY by the structural heart team.  I explained the risks of vascular complication, 10-15% chance of requiring a pacemaker post procedure and possible paravalvular leakage. \par \par She will require CT scan and catheterization before finalizing plans for the procedure. The patient was also made aware of the possibility of using conscious sedation or general anesthesia during the procedure.  Patient is concerned about her age and undergoing the TAVR procedure at this time. \par \par Plan:\par 1) Will begin structural heart evaluation with CT scan \par \par

## 2021-12-21 NOTE — DISCUSSION/SUMMARY
[Severe Aortic Stenosis] : severe aortic stenosis [Stable] : stable [Multidetector Cardiac CT] : multidetector cardiac CT [None] : There are no changes in medication management [Aortic Valve Replacement] : aortic valve replacement [Patient] : the patient [FreeTextEntry1] : Mrs Travis is referred for evaluation of severe aortic stenosis. She has fatigue and some degree of functional decline which is multifactorial in etiology. She reports no dyspnea, angina, syncope, or signs of heart failure. She is hypertensive today but very anxious and did not take her morning BP medications--she will take them when she gets home. She is not really in the right place mentally to proceed with intervention at this time and repeatedly tells me "this is a total shock" and very unnerving for her to process. She is scheduled for her cardiac CT today (which we did for convenience given her use of [employer] for transportation, to minimize a trip back) but does not feel she is ready to proceed with it. I therefore offered that she take some time to think about everything we discussed today and consider rescheduling her CT for after the new year (she prefers not to do anything before the holidays). I advised that the CT will be helpful in that it will define if in fact she is a suitable candidate for a TF JORY. If she decides to proceed with the CT, she will call to discuss the results once completed, and if appropriate, next steps. I will notify Dr Olivas of our discussion and plan.

## 2021-12-21 NOTE — CARDIOLOGY SUMMARY
[de-identified] : NSR 82 [de-identified] : 12/10/21\par EF 55-60%, SOO 0.76 sqcm, mGr 48 mmHg, pGr 68 mmHg, AoV 4.1 sqcm

## 2021-12-21 NOTE — CONSULT LETTER
[Dear  ___] : Dear ~BILL, [Courtesy Letter:] : I had the pleasure of seeing your patient, [unfilled], in my office today. [Please see my note below.] : Please see my note below. [Consult Closing:] : Thank you very much for allowing me to participate in the care of this patient.  If you have any questions, please do not hesitate to contact me. [Sincerely,] : Sincerely, [FreeTextEntry2] : Bipin Olivas MD \par [address] [facility]\[place]  [address] [FreeTextEntry3] : Sharif Walker MD, FACS\par Attending Surgeon \par Cardiovascular & Thoracic Surgery\par  \par [facility] [employer]\par

## 2021-12-21 NOTE — PHYSICAL EXAM
[Frail] : frail [Normal Rate] : normal [Rhythm Regular] : regular [No Pitting Edema] : no pitting edema present [Clear Lung Fields] : clear lung fields [Soft] : abdomen soft [Normal Gait] : normal gait [No Edema] : no edema [Normal] : alert and oriented, normal memory [III] : a grade 3 [Rt] : no varicose veins of the right leg [Lt] : no varicose veins of the left leg [Right Carotid Bruit] : no bruit heard over the right carotid [Left Carotid Bruit] : no bruit heard over the left carotid

## 2021-12-21 NOTE — REVIEW OF SYSTEMS
[Feeling Tired] : feeling tired [Loss Of Hearing] : hearing loss [SOB on Exertion] : shortness of breath during exertion [Joint Swelling] : joint swelling [Joint Stiffness] : joint stiffness [Negative] : Heme/Lymph [Chest Pain] : no chest pain [Palpitations] : no palpitations [Dizziness] : no dizziness

## 2021-12-21 NOTE — DATA REVIEWED
[FreeTextEntry1] : Echo: 12/10/21\par EF 55-60%, SOO 0.76 sqcm, mGr 48 mmHg, pGr 68 mmHg, AoV 4.1 sqcm \par  \par

## 2021-12-21 NOTE — REVIEW OF SYSTEMS
[Feeling Fatigued] : feeling fatigued [Joint Pain] : joint pain [Negative] : Heme/Lymph [Fever] : no fever [Chills] : no chills [Dyspnea on exertion] : not dyspnea during exertion [Chest Discomfort] : no chest discomfort [Lower Ext Edema] : no extremity edema [Abdominal Pain] : no abdominal pain [Nausea] : no nausea [Change in Appetite] : no change in appetite

## 2021-12-21 NOTE — HISTORY OF PRESENT ILLNESS
[FreeTextEntry1] : Mrs. Travis is a 89 year old female referred by Dr. Bipin Olivas for evaluation of aortic stenosis. Her Endocrinologist noted a cardiac murmur and referred her to Dr. Olivas for evaluation. She reports several months of increasing fatigue along with a decline in overall stamina. She has no dyspnea, angina, PND, orthopnea, dizziness, or syncope. She suffered a mechanical fall in November fracturing her left thumb. Her past medical history includes GERD, HTN, HLD, T2DM, neuropathy, temporal arteritis, and arthritis. She is  and lives alone. Her son passed away several years ago and her daughter lives in [place]. \par \par She is hypertensive this morning, she first took her AM BP meds while in the waiting room. She follows with Dr. Candace Castro for "over 14 years" for her HTN management. \par \par I have reviewed her outpatient echocardiogram with Dr. Barrera in detail, which demonstrates an SOO of 0.71, DVI 0.22, peak and mean gradients of 77 and 48 mmHg, and an AoV of 4.4m/s; LV function is hyperdynamic.\par \par When I asked about her history of AS she states "this was a complete shock to me" as she reports having had an echocardiogram with Dr Tafoya 2 years ago and was not aware of having significant AS (it was reported as severe with an SOO of 0.9 at that time). Based on the gradients, I suspect it was considered more moderate at that time. She reports having no family locally other than a couple of nieces. She states "all I know is I have osteoporosis" which has "affected me more than anything" with respect to her mobility. She sees Dr Castro for HTN, as noted above. She lives alone and maintains her independence since losing her  19 years ago and her son 5 years ago. She has a daughter in [place] and some great grandchildren (3 in [place] and 1 in [place]). She has no angina, dizziness, or syncopal symptoms. Her daughter wants her to move to [place], which she is considering.

## 2021-12-21 NOTE — PHYSICAL EXAM
[General Appearance - Alert] : alert [General Appearance - In No Acute Distress] : in no acute distress [Jugular Venous Distention Increased] : there was no jugular-venous distention [] : no respiratory distress [Respiration, Rhythm And Depth] : normal respiratory rhythm and effort [III] : a grade 3 [IV] : a grade 4 [Bowel Sounds] : normal bowel sounds [Abdomen Soft] : soft [Cervical Lymph Nodes Enlarged Posterior Bilaterally] : posterior cervical [Cervical Lymph Nodes Enlarged Anterior Bilaterally] : anterior cervical [Involuntary Movements] : no involuntary movements were seen [Skin Color & Pigmentation] : normal skin color and pigmentation [Oriented To Time, Place, And Person] : oriented to person, place, and time [Impaired Insight] : insight and judgment were intact [Right Carotid Bruit] : no bruit heard over the right carotid [Left Carotid Bruit] : no bruit heard over the left carotid

## 2021-12-21 NOTE — HISTORY OF PRESENT ILLNESS
[FreeTextEntry1] : Mrs. Travis is a 89 year old female with past medical history that includes GERD, HTN, HLD, T2DM, neuropathy, temporal arteritis arthritis and aortic valve disease. She was referred by Dr. Bipin Olivas for evaluation of aortic stenosis. Her endocrinologist noted a worsening cardiac murmur and referred her to Dr. Olivas for evaluation. She reports several months of increasing fatigue (NYHA II) along with decline in overall stamina. She has no dyspnea, angina, PND, orthopnea, dizziness, or syncope. She suffered a mechanical fall in November fracturing her left thumb.. She is  and lives alone. Her son passed away, and her daughter lives in [place]. \par \par \par

## 2022-01-06 ENCOUNTER — RESULT REVIEW (OUTPATIENT)
Age: 87
End: 2022-01-06

## 2022-01-06 ENCOUNTER — APPOINTMENT (OUTPATIENT)
Dept: CARDIOLOGY | Facility: CLINIC | Age: 87
End: 2022-01-06

## 2022-01-06 ENCOUNTER — OUTPATIENT (OUTPATIENT)
Dept: OUTPATIENT SERVICES | Facility: HOSPITAL | Age: 87
LOS: 1 days | End: 2022-01-06
Payer: MEDICARE

## 2022-01-06 DIAGNOSIS — I35.0 NONRHEUMATIC AORTIC (VALVE) STENOSIS: ICD-10-CM

## 2022-01-06 DIAGNOSIS — R94.39 ABNORMAL RESULT OF OTHER CARDIOVASCULAR FUNCTION STUDY: ICD-10-CM

## 2022-01-06 PROCEDURE — 75572 CT HRT W/3D IMAGE: CPT | Mod: MH

## 2022-01-06 PROCEDURE — 75572 CT HRT W/3D IMAGE: CPT | Mod: 26,MH

## 2022-02-11 ENCOUNTER — NON-APPOINTMENT (OUTPATIENT)
Age: 87
End: 2022-02-11

## 2022-03-10 ENCOUNTER — APPOINTMENT (OUTPATIENT)
Dept: CARDIOLOGY | Facility: CLINIC | Age: 87
End: 2022-03-10

## 2022-03-24 ENCOUNTER — NON-APPOINTMENT (OUTPATIENT)
Age: 87
End: 2022-03-24

## 2022-03-28 NOTE — REASON FOR VISIT
RN Contacted patient and she is safe, has no plan to harm herself. 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 She states she was answering the questionnaire truthfully, and has been overwhelmed with the itching and medical situation she is currently going through.   She does feel like she has symptoms consistent with depression, but again relates it to her current situation.     She is scheduled for appointment with PCP this afternoon. No further questions or needs identified.   Olamide Robbins RN on 3/28/2022 at 10:53 AM     [Initial Evaluation] : an initial evaluation

## 2022-04-25 ENCOUNTER — APPOINTMENT (OUTPATIENT)
Dept: NEPHROLOGY | Facility: CLINIC | Age: 87
End: 2022-04-25
Payer: MEDICARE

## 2022-04-25 VITALS
HEIGHT: 56 IN | WEIGHT: 88.18 LBS | SYSTOLIC BLOOD PRESSURE: 182 MMHG | DIASTOLIC BLOOD PRESSURE: 75 MMHG | HEART RATE: 82 BPM | OXYGEN SATURATION: 96 % | BODY MASS INDEX: 19.84 KG/M2 | TEMPERATURE: 97.6 F

## 2022-04-25 VITALS — DIASTOLIC BLOOD PRESSURE: 70 MMHG | HEART RATE: 70 BPM | SYSTOLIC BLOOD PRESSURE: 170 MMHG

## 2022-04-25 DIAGNOSIS — I10 ESSENTIAL (PRIMARY) HYPERTENSION: ICD-10-CM

## 2022-04-25 PROCEDURE — 99214 OFFICE O/P EST MOD 30 MIN: CPT

## 2022-04-25 RX ORDER — DENOSUMAB 60 MG/ML
60 INJECTION SUBCUTANEOUS
Qty: 1 | Refills: 0 | Status: ACTIVE | COMMUNITY
Start: 2022-01-24

## 2022-04-25 NOTE — ASSESSMENT
[FreeTextEntry1] : 89 year-old with history of hypertension with periods of acceleration and a reactive component.\par Hypertension: BP higher than goal but do want to avoid hypotension in setting of severe AS. Will not change any meds for now\par Salt restriction\par Coreg twice a day and losartan maintenance with felodipine as needed\par Cardio followup with decision for TAVR\par Osteoporosis on prolia with Dr Cutler\par Emotional support provided\par Followup in 6 months

## 2022-04-25 NOTE — HISTORY OF PRESENT ILLNESS
[FreeTextEntry1] : 89 year-old female here for followup of hypertension. \par Took prolia for osteo with DR Cutler\par BP variable with spikes\par Pt had seen Dr Thomas for TAVR eval - pt very nervous about it and seeked another opinion at [facility] \par She is not sure what she wants to do\par Denies SOB just has fatigue\par She is going to [place] to visit her daughter in May\par \par

## 2022-05-26 ENCOUNTER — NON-APPOINTMENT (OUTPATIENT)
Age: 87
End: 2022-05-26

## 2022-06-15 ENCOUNTER — APPOINTMENT (OUTPATIENT)
Dept: ORTHOPEDIC SURGERY | Facility: CLINIC | Age: 87
End: 2022-06-15
Payer: MEDICARE

## 2022-06-15 VITALS — BODY MASS INDEX: 19.35 KG/M2 | HEIGHT: 56 IN | WEIGHT: 86 LBS

## 2022-06-15 DIAGNOSIS — S42.291A OTHER DISPLACED FRACTURE OF UPPER END OF RIGHT HUMERUS, INITIAL ENCOUNTER FOR CLOSED FRACTURE: ICD-10-CM

## 2022-06-15 DIAGNOSIS — S43.421A SPRAIN OF RIGHT ROTATOR CUFF CAPSULE, INITIAL ENCOUNTER: ICD-10-CM

## 2022-06-15 PROCEDURE — 73030 X-RAY EXAM OF SHOULDER: CPT | Mod: RT

## 2022-06-15 PROCEDURE — 23600 CLTX PROX HUMRL FX W/O MNPJ: CPT

## 2022-06-15 PROCEDURE — 99213 OFFICE O/P EST LOW 20 MIN: CPT | Mod: 57

## 2022-06-15 NOTE — PHYSICAL EXAM
[] : no sensory deficits [Right] : right shoulder [Fracture] : Fracture [FreeTextEntry1] : prox humerus with displacement and callus formation [TWNoteComboBox4] : passive forward flexion 105 degrees

## 2022-06-15 NOTE — HISTORY OF PRESENT ILLNESS
[7] : 7 [0] : 0 [Dull/Aching] : dull/aching [Radiating] : radiating [Intermittent] : intermittent [Meds] : meds [Ice] : ice [de-identified] : she is RHD, fall outside her home and went to [facility] er and told fx and sent to rehab facility, [facility], in sling, was admitted there after fall 4/29, she has less pain and swelling [] : no [FreeTextEntry1] : right humerus [FreeTextEntry3] : June 2022 [FreeTextEntry5] : Pt she fell while walking in June 2022 and she land. [FreeTextEntry7] : forearm [FreeTextEntry9] : sling [de-identified] : movement  [de-identified] : [place] [de-identified] : Xray [de-identified] : ice, meds, chinaing

## 2022-07-19 ENCOUNTER — NON-APPOINTMENT (OUTPATIENT)
Age: 87
End: 2022-07-19

## 2022-10-25 ENCOUNTER — APPOINTMENT (OUTPATIENT)
Dept: NEPHROLOGY | Facility: CLINIC | Age: 87
End: 2022-10-25

## 2022-11-14 NOTE — ED PROVIDER NOTE - HEAD, MLM
Occurred over weekend.  No BRBPR observed today.  Hgb has remained stable  - holding DVT ppx, continue ASA  - plan was for C scope today but was not able to complete prep, will plan for first thing tomorrow AM  - bowel prep, miralax extra dose this evening, NPO midnight Head is atraumatic. Head shape is symmetrical.

## 2022-12-30 NOTE — ED PROVIDER NOTE - OBJECTIVE STATEMENT
Pertinent PMH/PSH/FHx/SHx and Review of Systems contained within:  89F hx htn, hypothyroid pw mech fall. patient was reaching out of bed to answer phone when she toppled onto her left side, hitting head, shoulder, elbow and knee on the ground. patient denies loc. initially she had no symptoms and so took her morning meds including asa. after several minutes, pain developed at the aforementioned sites with some swelling. there is no vision loss, dizziness, nausea, vomiting, gait instability, numbness, weakness or tingling. patient still using the left elbow and left knee normally. she did not take analgesia or ice  Fh and Sh not otherwise contributory  ROS otherwise negative
Statement Selected